# Patient Record
Sex: FEMALE | HISPANIC OR LATINO | Employment: PART TIME | URBAN - METROPOLITAN AREA
[De-identification: names, ages, dates, MRNs, and addresses within clinical notes are randomized per-mention and may not be internally consistent; named-entity substitution may affect disease eponyms.]

---

## 2023-03-02 ENCOUNTER — HOSPITAL ENCOUNTER (OUTPATIENT)
Dept: ULTRASOUND IMAGING | Facility: CLINIC | Age: 28
Discharge: HOME | End: 2023-03-02
Attending: MIDWIFE | Admitting: MIDWIFE
Payer: MEDICAID

## 2023-03-02 DIAGNOSIS — Z34.02 ENCOUNTER FOR PRENATAL CARE IN SECOND TRIMESTER OF FIRST PREGNANCY: ICD-10-CM

## 2023-03-02 DIAGNOSIS — O09.30 LATE PRENATAL CARE: ICD-10-CM

## 2023-03-02 PROCEDURE — 76805 OB US >/= 14 WKS SNGL FETUS: CPT

## 2023-03-02 PROCEDURE — 76805 OB US >/= 14 WKS SNGL FETUS: CPT | Mod: 26 | Performed by: RADIOLOGY

## 2023-03-06 LAB
HEPATITIS B SURFACE ANTIGEN (EXTERNAL): NONREACTIVE
RUBELLA ANTIBODY IGG (EXTERNAL): NORMAL

## 2023-03-27 ENCOUNTER — HOSPITAL ENCOUNTER (OUTPATIENT)
Dept: ULTRASOUND IMAGING | Facility: CLINIC | Age: 28
Discharge: HOME | End: 2023-03-27
Attending: NURSE PRACTITIONER | Admitting: NURSE PRACTITIONER
Payer: MEDICAID

## 2023-03-27 DIAGNOSIS — Z34.82 ENCOUNTER FOR SUPERVISION OF OTHER NORMAL PREGNANCY, SECOND TRIMESTER: ICD-10-CM

## 2023-03-27 PROCEDURE — 76816 OB US FOLLOW-UP PER FETUS: CPT

## 2023-03-27 PROCEDURE — 76816 OB US FOLLOW-UP PER FETUS: CPT | Mod: 26 | Performed by: RADIOLOGY

## 2023-04-17 LAB
HIV1+2 AB SERPL QL IA: NEGATIVE
VDRL (SYPHILIS) (EXTERNAL): NEGATIVE

## 2023-05-17 ENCOUNTER — MEDICAL CORRESPONDENCE (OUTPATIENT)
Dept: HEALTH INFORMATION MANAGEMENT | Facility: CLINIC | Age: 28
End: 2023-05-17
Payer: COMMERCIAL

## 2023-05-19 ENCOUNTER — TRANSCRIBE ORDERS (OUTPATIENT)
Dept: MATERNAL FETAL MEDICINE | Facility: CLINIC | Age: 28
End: 2023-05-19
Payer: COMMERCIAL

## 2023-05-19 ENCOUNTER — PRE VISIT (OUTPATIENT)
Dept: MATERNAL FETAL MEDICINE | Facility: CLINIC | Age: 28
End: 2023-05-19
Payer: COMMERCIAL

## 2023-05-19 DIAGNOSIS — O26.90 PREGNANCY RELATED CONDITION, ANTEPARTUM: Primary | ICD-10-CM

## 2023-05-22 ENCOUNTER — HOSPITAL ENCOUNTER (OUTPATIENT)
Dept: ULTRASOUND IMAGING | Facility: CLINIC | Age: 28
Discharge: HOME | End: 2023-05-22
Attending: MIDWIFE
Payer: COMMERCIAL

## 2023-05-22 ENCOUNTER — OFFICE VISIT (OUTPATIENT)
Dept: MATERNAL FETAL MEDICINE | Facility: CLINIC | Age: 28
End: 2023-05-22
Attending: MIDWIFE
Payer: COMMERCIAL

## 2023-05-22 DIAGNOSIS — O26.90 PREGNANCY RELATED CONDITION, ANTEPARTUM: ICD-10-CM

## 2023-05-22 DIAGNOSIS — O24.415 GESTATIONAL DIABETES MELLITUS (GDM) IN THIRD TRIMESTER CONTROLLED ON ORAL HYPOGLYCEMIC DRUG: Primary | ICD-10-CM

## 2023-05-22 PROCEDURE — 76811 OB US DETAILED SNGL FETUS: CPT | Mod: 26 | Performed by: OBSTETRICS & GYNECOLOGY

## 2023-05-22 PROCEDURE — 76811 OB US DETAILED SNGL FETUS: CPT

## 2023-05-22 PROCEDURE — 99203 OFFICE O/P NEW LOW 30 MIN: CPT | Mod: 25 | Performed by: OBSTETRICS & GYNECOLOGY

## 2023-05-22 NOTE — PROGRESS NOTES
Please see the imaging tab for details of the ultrasound performed today.    Nena Lawton MD  Specialist in Maternal-Fetal Medicine

## 2023-05-22 NOTE — NURSING NOTE
Patient presents to M for RL2. Positive fetal movement. Denies LOF, vaginal bleeding or cramping/contractions. SBAR given to [facility] MD, see their note in Epic.

## 2023-06-01 ENCOUNTER — OFFICE VISIT (OUTPATIENT)
Dept: MATERNAL FETAL MEDICINE | Facility: CLINIC | Age: 28
End: 2023-06-01
Attending: OBSTETRICS & GYNECOLOGY
Payer: COMMERCIAL

## 2023-06-01 ENCOUNTER — HOSPITAL ENCOUNTER (OUTPATIENT)
Dept: ULTRASOUND IMAGING | Facility: CLINIC | Age: 28
Discharge: HOME | End: 2023-06-01
Attending: OBSTETRICS & GYNECOLOGY
Payer: COMMERCIAL

## 2023-06-01 DIAGNOSIS — O24.415 GESTATIONAL DIABETES MELLITUS (GDM) IN THIRD TRIMESTER CONTROLLED ON ORAL HYPOGLYCEMIC DRUG: ICD-10-CM

## 2023-06-01 DIAGNOSIS — O24.415 GESTATIONAL DIABETES MELLITUS (GDM) IN THIRD TRIMESTER CONTROLLED ON ORAL HYPOGLYCEMIC DRUG: Primary | ICD-10-CM

## 2023-06-01 PROCEDURE — 76819 FETAL BIOPHYS PROFIL W/O NST: CPT

## 2023-06-01 PROCEDURE — 76819 FETAL BIOPHYS PROFIL W/O NST: CPT | Mod: 26 | Performed by: OBSTETRICS & GYNECOLOGY

## 2023-06-01 NOTE — NURSING NOTE
Patient presents to MFM for BPP. Positive fetal movement. Denies LOF, vaginal bleeding or cramping/contractions. SBAR given to MFM MD, see their note in Epic.

## 2023-06-01 NOTE — PROGRESS NOTES
The patient was seen for an ultrasound in the Maternal-Fetal Medicine Center at the [facility] today.  For a detailed report of the ultrasound examination, please see the ultrasound report which can be found under the imaging tab.    If you have questions regarding today's evaluation or if we can be of further service, please contact the Maternal-Fetal Medicine Center.    Jennifer Rios MD  , OB/GYN  Maternal-Fetal Medicine  [phone] (Pager)

## 2023-06-05 ENCOUNTER — OFFICE VISIT (OUTPATIENT)
Dept: MATERNAL FETAL MEDICINE | Facility: CLINIC | Age: 28
End: 2023-06-05
Attending: OBSTETRICS & GYNECOLOGY
Payer: COMMERCIAL

## 2023-06-05 ENCOUNTER — HOSPITAL ENCOUNTER (OUTPATIENT)
Dept: ULTRASOUND IMAGING | Facility: CLINIC | Age: 28
Discharge: HOME | End: 2023-06-05
Attending: OBSTETRICS & GYNECOLOGY
Payer: COMMERCIAL

## 2023-06-05 DIAGNOSIS — O24.415 GESTATIONAL DIABETES MELLITUS (GDM) IN THIRD TRIMESTER CONTROLLED ON ORAL HYPOGLYCEMIC DRUG: Primary | ICD-10-CM

## 2023-06-05 DIAGNOSIS — O24.415 GESTATIONAL DIABETES MELLITUS (GDM) IN THIRD TRIMESTER CONTROLLED ON ORAL HYPOGLYCEMIC DRUG: ICD-10-CM

## 2023-06-05 PROCEDURE — 76819 FETAL BIOPHYS PROFIL W/O NST: CPT | Mod: 26 | Performed by: OBSTETRICS & GYNECOLOGY

## 2023-06-05 PROCEDURE — 76819 FETAL BIOPHYS PROFIL W/O NST: CPT

## 2023-06-05 NOTE — PROGRESS NOTES
"Please see \"imaging\" tab under \"Chart Review\" for details of today's US at the [facility].    Philipp Rice MD  Maternal-Fetal Medicine    "

## 2023-06-05 NOTE — NURSING NOTE
IPAD  used for [facility] ultrasound and office visit.  Patient reports positive fetal movement, no pain, no contractions, leaking of fluid, or bleeding.  Reports blood sugar values fasting today 85, within normal range on metformin.  Patient denies headache, visual changes, nausea/vomiting, epigastric pain related to preeclampsia.  Education provided to patient on biophysical profile.  SBAR given to MFM MD, see their note in Epic.

## 2023-06-08 ENCOUNTER — HOSPITAL ENCOUNTER (OUTPATIENT)
Dept: ULTRASOUND IMAGING | Facility: CLINIC | Age: 28
Discharge: HOME | End: 2023-06-08
Attending: OBSTETRICS & GYNECOLOGY
Payer: COMMERCIAL

## 2023-06-08 ENCOUNTER — OFFICE VISIT (OUTPATIENT)
Dept: MATERNAL FETAL MEDICINE | Facility: CLINIC | Age: 28
End: 2023-06-08
Attending: OBSTETRICS & GYNECOLOGY
Payer: COMMERCIAL

## 2023-06-08 DIAGNOSIS — O24.415 GESTATIONAL DIABETES MELLITUS (GDM) IN THIRD TRIMESTER CONTROLLED ON ORAL HYPOGLYCEMIC DRUG: Primary | ICD-10-CM

## 2023-06-08 DIAGNOSIS — O24.415 GESTATIONAL DIABETES MELLITUS (GDM) IN THIRD TRIMESTER CONTROLLED ON ORAL HYPOGLYCEMIC DRUG: ICD-10-CM

## 2023-06-08 PROCEDURE — 76819 FETAL BIOPHYS PROFIL W/O NST: CPT

## 2023-06-08 PROCEDURE — 76819 FETAL BIOPHYS PROFIL W/O NST: CPT | Mod: 26 | Performed by: OBSTETRICS & GYNECOLOGY

## 2023-06-08 NOTE — PROGRESS NOTES
"Please see \"imaging\" tab under \"Chart Review\" for details of today's US at the [facility].    Philipp Rice MD  Maternal-Fetal Medicine    "

## 2023-06-08 NOTE — NURSING NOTE
Patient presents to [facility] for BPP at 33+5 weeks due to GDM on Metformin. Positive fetal movement. Denies LOF, vaginal bleeding or cramping/contractions. Patient states BS are largely within normal limits. SBAR given to MFM MD, see their note in Epic. iPad  used for [facility] appoinitment today.  Patient will continue 2x/week surveillance with [facility].

## 2023-06-12 ENCOUNTER — OFFICE VISIT (OUTPATIENT)
Dept: MATERNAL FETAL MEDICINE | Facility: CLINIC | Age: 28
End: 2023-06-12
Attending: OBSTETRICS & GYNECOLOGY
Payer: COMMERCIAL

## 2023-06-12 ENCOUNTER — HOSPITAL ENCOUNTER (OUTPATIENT)
Dept: ULTRASOUND IMAGING | Facility: CLINIC | Age: 28
Discharge: HOME | End: 2023-06-12
Attending: OBSTETRICS & GYNECOLOGY
Payer: COMMERCIAL

## 2023-06-12 DIAGNOSIS — O24.415 GESTATIONAL DIABETES MELLITUS (GDM) IN THIRD TRIMESTER CONTROLLED ON ORAL HYPOGLYCEMIC DRUG: Primary | ICD-10-CM

## 2023-06-12 DIAGNOSIS — O24.415 GESTATIONAL DIABETES MELLITUS (GDM) IN THIRD TRIMESTER CONTROLLED ON ORAL HYPOGLYCEMIC DRUG: ICD-10-CM

## 2023-06-12 PROCEDURE — 76816 OB US FOLLOW-UP PER FETUS: CPT | Mod: 26 | Performed by: OBSTETRICS & GYNECOLOGY

## 2023-06-12 PROCEDURE — 76819 FETAL BIOPHYS PROFIL W/O NST: CPT | Mod: 26 | Performed by: OBSTETRICS & GYNECOLOGY

## 2023-06-12 PROCEDURE — 76816 OB US FOLLOW-UP PER FETUS: CPT

## 2023-06-12 NOTE — PROGRESS NOTES
The patient was seen for an ultrasound in the Maternal-Fetal Medicine Center at the [facility] today.  For a detailed report of the ultrasound examination, please see the ultrasound report which can be found under the imaging tab.    If you have questions regarding today's evaluation or if we can be of further service, please contact the Maternal-Fetal Medicine Center.    Jennifer Rios MD  , OB/GYN  Maternal-Fetal Medicine  [phone] (Pager)

## 2023-06-15 ENCOUNTER — OFFICE VISIT (OUTPATIENT)
Dept: MATERNAL FETAL MEDICINE | Facility: CLINIC | Age: 28
End: 2023-06-15
Attending: OBSTETRICS & GYNECOLOGY
Payer: COMMERCIAL

## 2023-06-15 ENCOUNTER — HOSPITAL ENCOUNTER (OUTPATIENT)
Dept: ULTRASOUND IMAGING | Facility: CLINIC | Age: 28
Discharge: HOME | End: 2023-06-15
Attending: OBSTETRICS & GYNECOLOGY
Payer: COMMERCIAL

## 2023-06-15 DIAGNOSIS — O24.415 GESTATIONAL DIABETES MELLITUS (GDM) IN THIRD TRIMESTER CONTROLLED ON ORAL HYPOGLYCEMIC DRUG: ICD-10-CM

## 2023-06-15 DIAGNOSIS — O24.415 GESTATIONAL DIABETES MELLITUS (GDM) IN THIRD TRIMESTER CONTROLLED ON ORAL HYPOGLYCEMIC DRUG: Primary | ICD-10-CM

## 2023-06-15 PROCEDURE — 76819 FETAL BIOPHYS PROFIL W/O NST: CPT

## 2023-06-15 PROCEDURE — 76819 FETAL BIOPHYS PROFIL W/O NST: CPT | Mod: 26 | Performed by: OBSTETRICS & GYNECOLOGY

## 2023-06-15 NOTE — NURSING NOTE
Patient presents to [facility] for BPP.  ipad used for duration of appointment. Positive fetal movement. Denies LOF, vaginal bleeding or cramping/contractions. States BS are well controlled. SBAR given to [facility] BORJAS, see their note in Epic.

## 2023-06-15 NOTE — PROGRESS NOTES
"Please see \"imaging\" tab under \"Chart Review\" for details of today's US at the [facility].    Philipp Rice MD  Maternal-Fetal Medicine    "

## 2023-06-19 ENCOUNTER — HOSPITAL ENCOUNTER (OUTPATIENT)
Dept: ULTRASOUND IMAGING | Facility: CLINIC | Age: 28
Discharge: HOME | End: 2023-06-19
Attending: OBSTETRICS & GYNECOLOGY
Payer: COMMERCIAL

## 2023-06-19 ENCOUNTER — OFFICE VISIT (OUTPATIENT)
Dept: MATERNAL FETAL MEDICINE | Facility: CLINIC | Age: 28
End: 2023-06-19
Attending: OBSTETRICS & GYNECOLOGY
Payer: COMMERCIAL

## 2023-06-19 DIAGNOSIS — O24.415 GESTATIONAL DIABETES MELLITUS (GDM) IN THIRD TRIMESTER CONTROLLED ON ORAL HYPOGLYCEMIC DRUG: ICD-10-CM

## 2023-06-19 DIAGNOSIS — O24.415 GESTATIONAL DIABETES MELLITUS (GDM) IN THIRD TRIMESTER CONTROLLED ON ORAL HYPOGLYCEMIC DRUG: Primary | ICD-10-CM

## 2023-06-19 PROCEDURE — 76819 FETAL BIOPHYS PROFIL W/O NST: CPT

## 2023-06-19 PROCEDURE — 76819 FETAL BIOPHYS PROFIL W/O NST: CPT | Mod: 26 | Performed by: OBSTETRICS & GYNECOLOGY

## 2023-06-19 NOTE — NURSING NOTE
IPAD  used for [facility] ultrasound and office visit. Patient reports positive fetal movement, no pain, no contractions, leaking of fluid, or bleeding.  Reports blood sugar values within range with metformin.  Patient denies headache, visual changes, nausea/vomiting, epigastric pain related to preeclampsia. SBAR given to MFM MD, see their note in Epic.

## 2023-06-19 NOTE — PROGRESS NOTES
Please refer to ultrasound report under 'Imaging' Studies of 'Chart Review' tabs.    Mik Boyle M.D.

## 2023-06-22 ENCOUNTER — HOSPITAL ENCOUNTER (OUTPATIENT)
Dept: ULTRASOUND IMAGING | Facility: CLINIC | Age: 28
Discharge: HOME | End: 2023-06-22
Attending: OBSTETRICS & GYNECOLOGY
Payer: COMMERCIAL

## 2023-06-22 ENCOUNTER — OFFICE VISIT (OUTPATIENT)
Dept: MATERNAL FETAL MEDICINE | Facility: CLINIC | Age: 28
End: 2023-06-22
Attending: OBSTETRICS & GYNECOLOGY
Payer: COMMERCIAL

## 2023-06-22 DIAGNOSIS — O24.415 GESTATIONAL DIABETES MELLITUS (GDM) IN THIRD TRIMESTER CONTROLLED ON ORAL HYPOGLYCEMIC DRUG: ICD-10-CM

## 2023-06-22 DIAGNOSIS — O24.415 GESTATIONAL DIABETES MELLITUS (GDM) IN THIRD TRIMESTER CONTROLLED ON ORAL HYPOGLYCEMIC DRUG: Primary | ICD-10-CM

## 2023-06-22 PROCEDURE — 76819 FETAL BIOPHYS PROFIL W/O NST: CPT

## 2023-06-22 PROCEDURE — 76819 FETAL BIOPHYS PROFIL W/O NST: CPT | Mod: 26 | Performed by: OBSTETRICS & GYNECOLOGY

## 2023-06-22 NOTE — PROGRESS NOTES
The patient was seen for an ultrasound in the Maternal-Fetal Medicine Center at the [facility] today.  For a detailed report of the ultrasound examination, please see the ultrasound report which can be found under the imaging tab.    If you have questions regarding today's evaluation or if we can be of further service, please contact the Maternal-Fetal Medicine Center.    Jennifer Rios MD  , OB/GYN  Maternal-Fetal Medicine  [phone] (Pager)

## 2023-06-22 NOTE — NURSING NOTE
Ipad  used for appointment. Patient presents to MFM for BPP. Positive fetal movement. Denies LOF, vaginal bleeding or cramping/contractions. SBAR given to MFM MD, see their note in Epic.    Rosalia Preciado RN

## 2023-06-26 ENCOUNTER — HOSPITAL ENCOUNTER (OUTPATIENT)
Dept: ULTRASOUND IMAGING | Facility: CLINIC | Age: 28
Discharge: HOME | End: 2023-06-26
Attending: OBSTETRICS & GYNECOLOGY
Payer: COMMERCIAL

## 2023-06-26 ENCOUNTER — OFFICE VISIT (OUTPATIENT)
Dept: MATERNAL FETAL MEDICINE | Facility: CLINIC | Age: 28
End: 2023-06-26
Attending: OBSTETRICS & GYNECOLOGY
Payer: COMMERCIAL

## 2023-06-26 DIAGNOSIS — O24.415 GESTATIONAL DIABETES MELLITUS (GDM) IN THIRD TRIMESTER CONTROLLED ON ORAL HYPOGLYCEMIC DRUG: ICD-10-CM

## 2023-06-26 DIAGNOSIS — O24.415 GESTATIONAL DIABETES MELLITUS (GDM) IN THIRD TRIMESTER CONTROLLED ON ORAL HYPOGLYCEMIC DRUG: Primary | ICD-10-CM

## 2023-06-26 PROCEDURE — 76819 FETAL BIOPHYS PROFIL W/O NST: CPT | Mod: 26 | Performed by: OBSTETRICS & GYNECOLOGY

## 2023-06-26 PROCEDURE — 76819 FETAL BIOPHYS PROFIL W/O NST: CPT

## 2023-06-26 NOTE — NURSING NOTE
Patient presents to [facility] for BPP due to GDM on metformin. States BS are WNL. Positive fetal movement. Denies LOF, vaginal bleeding or cramping/contractions. SBAR given to [facility] MD, see their note in Epic.    Rosalia Preciado RN

## 2023-06-26 NOTE — PROGRESS NOTES
Please see full imaging report from [employer] program under imaging tab.    BPP 8/8    Howie Fernandes MD  Maternal Fetal Medicine

## 2023-06-29 ENCOUNTER — HOSPITAL ENCOUNTER (OUTPATIENT)
Dept: ULTRASOUND IMAGING | Facility: CLINIC | Age: 28
Discharge: HOME | End: 2023-06-29
Attending: OBSTETRICS & GYNECOLOGY
Payer: COMMERCIAL

## 2023-06-29 ENCOUNTER — OFFICE VISIT (OUTPATIENT)
Dept: MATERNAL FETAL MEDICINE | Facility: CLINIC | Age: 28
End: 2023-06-29
Attending: OBSTETRICS & GYNECOLOGY
Payer: COMMERCIAL

## 2023-06-29 DIAGNOSIS — O24.415 GESTATIONAL DIABETES MELLITUS (GDM) IN THIRD TRIMESTER CONTROLLED ON ORAL HYPOGLYCEMIC DRUG: ICD-10-CM

## 2023-06-29 DIAGNOSIS — O24.415 GESTATIONAL DIABETES MELLITUS (GDM) IN THIRD TRIMESTER CONTROLLED ON ORAL HYPOGLYCEMIC DRUG: Primary | ICD-10-CM

## 2023-06-29 PROCEDURE — 76819 FETAL BIOPHYS PROFIL W/O NST: CPT | Mod: 26 | Performed by: OBSTETRICS & GYNECOLOGY

## 2023-06-29 PROCEDURE — 76819 FETAL BIOPHYS PROFIL W/O NST: CPT

## 2023-06-29 NOTE — NURSING NOTE
[place] IPAD  used for MFM office visit and ultrasound.  Patient reports positive fetal movement, no pain, no contractions, leaking of fluid, or bleeding.  Reports blood sugar values fasting 86 this morning and post prandial within range.  Patient denies headache, visual changes, nausea/vomiting, epigastric pain related to preeclampsia.  SBAR given to MFM MD, see their note in Epic.

## 2023-06-29 NOTE — PROGRESS NOTES
"Please see \"Imaging\" tab under \"Chart Review\" for details of today's visit.    Eden Sepulveda    "

## 2023-07-03 ENCOUNTER — OFFICE VISIT (OUTPATIENT)
Dept: MATERNAL FETAL MEDICINE | Facility: CLINIC | Age: 28
End: 2023-07-03
Attending: OBSTETRICS & GYNECOLOGY
Payer: COMMERCIAL

## 2023-07-03 ENCOUNTER — HOSPITAL ENCOUNTER (OUTPATIENT)
Dept: ULTRASOUND IMAGING | Facility: CLINIC | Age: 28
Discharge: HOME | End: 2023-07-03
Attending: OBSTETRICS & GYNECOLOGY
Payer: COMMERCIAL

## 2023-07-03 DIAGNOSIS — O24.415 GESTATIONAL DIABETES MELLITUS (GDM) IN THIRD TRIMESTER CONTROLLED ON ORAL HYPOGLYCEMIC DRUG: ICD-10-CM

## 2023-07-03 DIAGNOSIS — O24.415 GESTATIONAL DIABETES MELLITUS (GDM) IN THIRD TRIMESTER CONTROLLED ON ORAL HYPOGLYCEMIC DRUG: Primary | ICD-10-CM

## 2023-07-03 LAB — GROUP B STREPTOCOCCUS (EXTERNAL): NEGATIVE

## 2023-07-03 PROCEDURE — 76816 OB US FOLLOW-UP PER FETUS: CPT | Mod: 26 | Performed by: OBSTETRICS & GYNECOLOGY

## 2023-07-03 PROCEDURE — 76819 FETAL BIOPHYS PROFIL W/O NST: CPT | Mod: 26 | Performed by: OBSTETRICS & GYNECOLOGY

## 2023-07-03 PROCEDURE — 76819 FETAL BIOPHYS PROFIL W/O NST: CPT

## 2023-07-03 NOTE — PROGRESS NOTES
Please see full imaging report from [employer] program under imaging tab.    Howie Fernandes MD  Maternal Fetal Medicine

## 2023-07-03 NOTE — NURSING NOTE
Patient presents to [facility] for RL2/BPP at 37+2 weeks due to GDM on Metformin. Patient states BS are largely within normal range. Positive fetal movement. Denies LOF, vaginal bleeding or cramping/contractions. SBAR given to YESSICA MD, see their note in Epic.

## 2023-07-07 ENCOUNTER — HOSPITAL ENCOUNTER (OUTPATIENT)
Dept: ULTRASOUND IMAGING | Facility: CLINIC | Age: 28
Discharge: HOME | End: 2023-07-07
Attending: OBSTETRICS & GYNECOLOGY
Payer: COMMERCIAL

## 2023-07-07 ENCOUNTER — OFFICE VISIT (OUTPATIENT)
Dept: MATERNAL FETAL MEDICINE | Facility: CLINIC | Age: 28
End: 2023-07-07
Attending: OBSTETRICS & GYNECOLOGY
Payer: COMMERCIAL

## 2023-07-07 DIAGNOSIS — O24.415 GESTATIONAL DIABETES MELLITUS (GDM) IN THIRD TRIMESTER CONTROLLED ON ORAL HYPOGLYCEMIC DRUG: Primary | ICD-10-CM

## 2023-07-07 DIAGNOSIS — O24.415 GESTATIONAL DIABETES MELLITUS (GDM) IN THIRD TRIMESTER CONTROLLED ON ORAL HYPOGLYCEMIC DRUG: ICD-10-CM

## 2023-07-07 PROCEDURE — 76819 FETAL BIOPHYS PROFIL W/O NST: CPT

## 2023-07-07 PROCEDURE — 76819 FETAL BIOPHYS PROFIL W/O NST: CPT | Mod: 26 | Performed by: OBSTETRICS & GYNECOLOGY

## 2023-07-07 NOTE — NURSING NOTE
Patient presents to M for BPP. Positive fetal movement. States BS are well controlled with Metformin. Denies LOF, vaginal bleeding or cramping/contractions. SBAR given to [facility] MD, see their note in Epic.     Ipad used for appointment.    Rosalia Preciado RN

## 2023-07-08 NOTE — PROGRESS NOTES
"Please see \"Imaging\" tab under \"Chart Review\" for details of today's ultrasound.    Meño Stahl M.D.  Specialist in Maternal-Fetal Medicine     "

## 2023-07-11 ENCOUNTER — OFFICE VISIT (OUTPATIENT)
Dept: MATERNAL FETAL MEDICINE | Facility: CLINIC | Age: 28
End: 2023-07-11
Attending: OBSTETRICS & GYNECOLOGY
Payer: COMMERCIAL

## 2023-07-11 ENCOUNTER — HOSPITAL ENCOUNTER (OUTPATIENT)
Dept: ULTRASOUND IMAGING | Facility: CLINIC | Age: 28
Discharge: HOME | End: 2023-07-11
Attending: OBSTETRICS & GYNECOLOGY
Payer: COMMERCIAL

## 2023-07-11 DIAGNOSIS — O24.415 GESTATIONAL DIABETES MELLITUS (GDM) IN THIRD TRIMESTER CONTROLLED ON ORAL HYPOGLYCEMIC DRUG: Primary | ICD-10-CM

## 2023-07-11 DIAGNOSIS — O24.415 GESTATIONAL DIABETES MELLITUS (GDM) IN THIRD TRIMESTER CONTROLLED ON ORAL HYPOGLYCEMIC DRUG: ICD-10-CM

## 2023-07-11 PROCEDURE — 76819 FETAL BIOPHYS PROFIL W/O NST: CPT | Mod: 26 | Performed by: OBSTETRICS & GYNECOLOGY

## 2023-07-11 PROCEDURE — 76819 FETAL BIOPHYS PROFIL W/O NST: CPT

## 2023-07-11 NOTE — NURSING NOTE
IPAD  used for [facility] ultrasound and office visit.  Patient reports positive fetal movement, no pain, no contractions, leaking of fluid, or bleeding.  Reports blood sugar values within range.  Pt hoping to wait for spontaneous labor.  SBAR given to [facility]ST[facility] BORJAS, see their note in Epic.

## 2023-07-14 ENCOUNTER — HOSPITAL ENCOUNTER (OUTPATIENT)
Dept: ULTRASOUND IMAGING | Facility: CLINIC | Age: 28
Discharge: HOME | End: 2023-07-14
Attending: OBSTETRICS & GYNECOLOGY
Payer: COMMERCIAL

## 2023-07-14 ENCOUNTER — HOSPITAL ENCOUNTER (INPATIENT)
Facility: CLINIC | Age: 28
LOS: 2 days | Discharge: HOME HEALTH | End: 2023-07-16
Attending: STUDENT IN AN ORGANIZED HEALTH CARE EDUCATION/TRAINING PROGRAM | Admitting: STUDENT IN AN ORGANIZED HEALTH CARE EDUCATION/TRAINING PROGRAM
Payer: COMMERCIAL

## 2023-07-14 ENCOUNTER — OFFICE VISIT (OUTPATIENT)
Dept: MATERNAL FETAL MEDICINE | Facility: CLINIC | Age: 28
End: 2023-07-14
Attending: OBSTETRICS & GYNECOLOGY
Payer: COMMERCIAL

## 2023-07-14 ENCOUNTER — ANESTHESIA (OUTPATIENT)
Dept: OBGYN | Facility: CLINIC | Age: 28
End: 2023-07-14
Payer: COMMERCIAL

## 2023-07-14 ENCOUNTER — ANESTHESIA EVENT (OUTPATIENT)
Dept: OBGYN | Facility: CLINIC | Age: 28
End: 2023-07-14
Payer: COMMERCIAL

## 2023-07-14 ENCOUNTER — HOSPITAL ENCOUNTER (OUTPATIENT)
Facility: CLINIC | Age: 28
End: 2023-07-14
Attending: FAMILY MEDICINE | Admitting: FAMILY MEDICINE
Payer: COMMERCIAL

## 2023-07-14 DIAGNOSIS — O24.415 GESTATIONAL DIABETES MELLITUS (GDM) IN THIRD TRIMESTER CONTROLLED ON ORAL HYPOGLYCEMIC DRUG: Primary | ICD-10-CM

## 2023-07-14 DIAGNOSIS — O24.415 GESTATIONAL DIABETES MELLITUS (GDM) IN THIRD TRIMESTER CONTROLLED ON ORAL HYPOGLYCEMIC DRUG: ICD-10-CM

## 2023-07-14 PROBLEM — E11.9 DIABETES (H): Status: ACTIVE | Noted: 2023-07-14

## 2023-07-14 LAB
ABO/RH(D): NORMAL
ANTIBODY SCREEN: NEGATIVE
GLUCOSE BLDC GLUCOMTR-MCNC: 110 MG/DL (ref 70–99)
GLUCOSE BLDC GLUCOMTR-MCNC: 112 MG/DL (ref 70–99)
GLUCOSE BLDC GLUCOMTR-MCNC: 115 MG/DL (ref 70–99)
GLUCOSE BLDC GLUCOMTR-MCNC: 117 MG/DL (ref 70–99)
GLUCOSE BLDC GLUCOMTR-MCNC: 130 MG/DL (ref 70–99)
GLUCOSE BLDC GLUCOMTR-MCNC: 134 MG/DL (ref 70–99)
HGB BLD-MCNC: 14 G/DL (ref 11.7–15.7)
SPECIMEN EXPIRATION DATE: NORMAL

## 2023-07-14 PROCEDURE — 76819 FETAL BIOPHYS PROFIL W/O NST: CPT

## 2023-07-14 PROCEDURE — 76819 FETAL BIOPHYS PROFIL W/O NST: CPT | Mod: 26 | Performed by: OBSTETRICS & GYNECOLOGY

## 2023-07-14 PROCEDURE — 36415 COLL VENOUS BLD VENIPUNCTURE: CPT | Performed by: STUDENT IN AN ORGANIZED HEALTH CARE EDUCATION/TRAINING PROGRAM

## 2023-07-14 PROCEDURE — 99213 OFFICE O/P EST LOW 20 MIN: CPT | Mod: 25 | Performed by: OBSTETRICS & GYNECOLOGY

## 2023-07-14 PROCEDURE — 370N000003 HC ANESTHESIA WARD SERVICE: Performed by: SURGERY

## 2023-07-14 PROCEDURE — 250N000011 HC RX IP 250 OP 636: Performed by: SURGERY

## 2023-07-14 PROCEDURE — 120N000001 HC R&B MED SURG/OB

## 2023-07-14 PROCEDURE — 86780 TREPONEMA PALLIDUM: CPT | Performed by: STUDENT IN AN ORGANIZED HEALTH CARE EDUCATION/TRAINING PROGRAM

## 2023-07-14 PROCEDURE — 85018 HEMOGLOBIN: CPT | Performed by: STUDENT IN AN ORGANIZED HEALTH CARE EDUCATION/TRAINING PROGRAM

## 2023-07-14 PROCEDURE — 10907ZC DRAINAGE OF AMNIOTIC FLUID, THERAPEUTIC FROM PRODUCTS OF CONCEPTION, VIA NATURAL OR ARTIFICIAL OPENING: ICD-10-PCS | Performed by: STUDENT IN AN ORGANIZED HEALTH CARE EDUCATION/TRAINING PROGRAM

## 2023-07-14 PROCEDURE — 250N000009 HC RX 250: Performed by: OBSTETRICS & GYNECOLOGY

## 2023-07-14 PROCEDURE — 250N000012 HC RX MED GY IP 250 OP 636 PS 637: Performed by: STUDENT IN AN ORGANIZED HEALTH CARE EDUCATION/TRAINING PROGRAM

## 2023-07-14 PROCEDURE — 86850 RBC ANTIBODY SCREEN: CPT | Performed by: STUDENT IN AN ORGANIZED HEALTH CARE EDUCATION/TRAINING PROGRAM

## 2023-07-14 PROCEDURE — 258N000003 HC RX IP 258 OP 636: Performed by: STUDENT IN AN ORGANIZED HEALTH CARE EDUCATION/TRAINING PROGRAM

## 2023-07-14 PROCEDURE — 250N000011 HC RX IP 250 OP 636: Mod: JZ | Performed by: SURGERY

## 2023-07-14 PROCEDURE — 250N000011 HC RX IP 250 OP 636: Performed by: STUDENT IN AN ORGANIZED HEALTH CARE EDUCATION/TRAINING PROGRAM

## 2023-07-14 RX ORDER — VITAMIN A ACETATE, .BETA.-CAROTENE, ASCORBIC ACID, CHOLECALCIFEROL, .ALPHA.-TOCOPHEROL ACETATE, DL-, THIAMINE MONONITRATE, RIBOFLAVIN, NIACINAMIDE, PYRIDOXINE HYDROCHLORIDE, FOLIC ACID, CYANOCOBALAMIN, CALCIUM CARBONATE, FERROUS FUMARATE, ZINC OXIDE, AND CUPRIC OXIDE 2000; 2000; 120; 400; 22; 1.84; 3; 20; 10; 1; 12; 200; 27; 25; 2 [IU]/1; [IU]/1; MG/1; [IU]/1; MG/1; MG/1; MG/1; MG/1; MG/1; MG/1; UG/1; MG/1; MG/1; MG/1; MG/1
1 TABLET ORAL DAILY
COMMUNITY

## 2023-07-14 RX ORDER — SODIUM CHLORIDE, SODIUM LACTATE, POTASSIUM CHLORIDE, CALCIUM CHLORIDE 600; 310; 30; 20 MG/100ML; MG/100ML; MG/100ML; MG/100ML
INJECTION, SOLUTION INTRAVENOUS CONTINUOUS PRN
Status: DISCONTINUED | OUTPATIENT
Start: 2023-07-14 | End: 2023-07-15 | Stop reason: HOSPADM

## 2023-07-14 RX ORDER — ONDANSETRON 4 MG/1
4 TABLET, ORALLY DISINTEGRATING ORAL EVERY 6 HOURS PRN
Status: DISCONTINUED | OUTPATIENT
Start: 2023-07-14 | End: 2023-07-15 | Stop reason: HOSPADM

## 2023-07-14 RX ORDER — METOCLOPRAMIDE 10 MG/1
10 TABLET ORAL EVERY 6 HOURS PRN
Status: DISCONTINUED | OUTPATIENT
Start: 2023-07-14 | End: 2023-07-15 | Stop reason: HOSPADM

## 2023-07-14 RX ORDER — NICOTINE POLACRILEX 4 MG
15-30 LOZENGE BUCCAL
Status: DISCONTINUED | OUTPATIENT
Start: 2023-07-14 | End: 2023-07-15 | Stop reason: HOSPADM

## 2023-07-14 RX ORDER — NALOXONE HYDROCHLORIDE 0.4 MG/ML
0.4 INJECTION, SOLUTION INTRAMUSCULAR; INTRAVENOUS; SUBCUTANEOUS
Status: DISCONTINUED | OUTPATIENT
Start: 2023-07-14 | End: 2023-07-15 | Stop reason: HOSPADM

## 2023-07-14 RX ORDER — NALOXONE HYDROCHLORIDE 0.4 MG/ML
0.2 INJECTION, SOLUTION INTRAMUSCULAR; INTRAVENOUS; SUBCUTANEOUS
Status: DISCONTINUED | OUTPATIENT
Start: 2023-07-14 | End: 2023-07-15 | Stop reason: HOSPADM

## 2023-07-14 RX ORDER — ROPIVACAINE HYDROCHLORIDE 2 MG/ML
INJECTION, SOLUTION EPIDURAL; INFILTRATION; PERINEURAL
Status: COMPLETED | OUTPATIENT
Start: 2023-07-14 | End: 2023-07-14

## 2023-07-14 RX ORDER — CARBOPROST TROMETHAMINE 250 UG/ML
250 INJECTION, SOLUTION INTRAMUSCULAR
Status: DISCONTINUED | OUTPATIENT
Start: 2023-07-14 | End: 2023-07-15 | Stop reason: HOSPADM

## 2023-07-14 RX ORDER — KETOROLAC TROMETHAMINE 30 MG/ML
30 INJECTION, SOLUTION INTRAMUSCULAR; INTRAVENOUS
Status: DISCONTINUED | OUTPATIENT
Start: 2023-07-14 | End: 2023-07-15

## 2023-07-14 RX ORDER — ROPIVACAINE HYDROCHLORIDE 2 MG/ML
10 INJECTION, SOLUTION EPIDURAL; INFILTRATION; PERINEURAL ONCE
Status: DISCONTINUED | OUTPATIENT
Start: 2023-07-14 | End: 2023-07-15 | Stop reason: HOSPADM

## 2023-07-14 RX ORDER — TRANEXAMIC ACID 10 MG/ML
1 INJECTION, SOLUTION INTRAVENOUS EVERY 30 MIN PRN
Status: DISCONTINUED | OUTPATIENT
Start: 2023-07-14 | End: 2023-07-15 | Stop reason: HOSPADM

## 2023-07-14 RX ORDER — PROCHLORPERAZINE 25 MG
25 SUPPOSITORY, RECTAL RECTAL EVERY 12 HOURS PRN
Status: DISCONTINUED | OUTPATIENT
Start: 2023-07-14 | End: 2023-07-15 | Stop reason: HOSPADM

## 2023-07-14 RX ORDER — FENTANYL CITRATE 50 UG/ML
50 INJECTION, SOLUTION INTRAMUSCULAR; INTRAVENOUS EVERY 30 MIN PRN
Status: DISCONTINUED | OUTPATIENT
Start: 2023-07-14 | End: 2023-07-15 | Stop reason: HOSPADM

## 2023-07-14 RX ORDER — EPHEDRINE SULFATE 50 MG/ML
5 INJECTION, SOLUTION INTRAMUSCULAR; INTRAVENOUS; SUBCUTANEOUS
Status: DISCONTINUED | OUTPATIENT
Start: 2023-07-14 | End: 2023-07-15 | Stop reason: HOSPADM

## 2023-07-14 RX ORDER — ONDANSETRON 2 MG/ML
4 INJECTION INTRAMUSCULAR; INTRAVENOUS EVERY 6 HOURS PRN
Status: DISCONTINUED | OUTPATIENT
Start: 2023-07-14 | End: 2023-07-15 | Stop reason: HOSPADM

## 2023-07-14 RX ORDER — PROCHLORPERAZINE MALEATE 5 MG
10 TABLET ORAL EVERY 6 HOURS PRN
Status: DISCONTINUED | OUTPATIENT
Start: 2023-07-14 | End: 2023-07-15 | Stop reason: HOSPADM

## 2023-07-14 RX ORDER — ACETAMINOPHEN 325 MG/1
650 TABLET ORAL EVERY 4 HOURS PRN
Status: DISCONTINUED | OUTPATIENT
Start: 2023-07-14 | End: 2023-07-15 | Stop reason: HOSPADM

## 2023-07-14 RX ORDER — METOCLOPRAMIDE HYDROCHLORIDE 5 MG/ML
10 INJECTION INTRAMUSCULAR; INTRAVENOUS EVERY 6 HOURS PRN
Status: DISCONTINUED | OUTPATIENT
Start: 2023-07-14 | End: 2023-07-15 | Stop reason: HOSPADM

## 2023-07-14 RX ORDER — METHYLERGONOVINE MALEATE 0.2 MG/ML
200 INJECTION INTRAVENOUS
Status: DISCONTINUED | OUTPATIENT
Start: 2023-07-14 | End: 2023-07-15 | Stop reason: HOSPADM

## 2023-07-14 RX ORDER — IBUPROFEN 400 MG/1
800 TABLET, FILM COATED ORAL
Status: DISCONTINUED | OUTPATIENT
Start: 2023-07-14 | End: 2023-07-15

## 2023-07-14 RX ORDER — OXYTOCIN 10 [USP'U]/ML
10 INJECTION, SOLUTION INTRAMUSCULAR; INTRAVENOUS
Status: DISCONTINUED | OUTPATIENT
Start: 2023-07-14 | End: 2023-07-15

## 2023-07-14 RX ORDER — OXYTOCIN/0.9 % SODIUM CHLORIDE 30/500 ML
100-340 PLASTIC BAG, INJECTION (ML) INTRAVENOUS CONTINUOUS PRN
Status: DISCONTINUED | OUTPATIENT
Start: 2023-07-14 | End: 2023-07-15

## 2023-07-14 RX ORDER — MISOPROSTOL 200 UG/1
800 TABLET ORAL
Status: DISCONTINUED | OUTPATIENT
Start: 2023-07-14 | End: 2023-07-15 | Stop reason: HOSPADM

## 2023-07-14 RX ORDER — DEXTROSE MONOHYDRATE 25 G/50ML
25-50 INJECTION, SOLUTION INTRAVENOUS
Status: DISCONTINUED | OUTPATIENT
Start: 2023-07-14 | End: 2023-07-15 | Stop reason: HOSPADM

## 2023-07-14 RX ORDER — OXYTOCIN/0.9 % SODIUM CHLORIDE 30/500 ML
340 PLASTIC BAG, INJECTION (ML) INTRAVENOUS CONTINUOUS PRN
Status: DISCONTINUED | OUTPATIENT
Start: 2023-07-14 | End: 2023-07-15 | Stop reason: HOSPADM

## 2023-07-14 RX ORDER — MISOPROSTOL 200 UG/1
400 TABLET ORAL
Status: DISCONTINUED | OUTPATIENT
Start: 2023-07-14 | End: 2023-07-15 | Stop reason: HOSPADM

## 2023-07-14 RX ORDER — SODIUM CHLORIDE 9 MG/ML
INJECTION, SOLUTION INTRAVENOUS CONTINUOUS
Status: DISCONTINUED | OUTPATIENT
Start: 2023-07-14 | End: 2023-07-15 | Stop reason: HOSPADM

## 2023-07-14 RX ORDER — NALBUPHINE HYDROCHLORIDE 20 MG/ML
2.5-5 INJECTION, SOLUTION INTRAMUSCULAR; INTRAVENOUS; SUBCUTANEOUS EVERY 6 HOURS PRN
Status: DISCONTINUED | OUTPATIENT
Start: 2023-07-14 | End: 2023-07-15

## 2023-07-14 RX ORDER — SODIUM CHLORIDE, SODIUM LACTATE, POTASSIUM CHLORIDE, CALCIUM CHLORIDE 600; 310; 30; 20 MG/100ML; MG/100ML; MG/100ML; MG/100ML
INJECTION, SOLUTION INTRAVENOUS CONTINUOUS
Status: DISCONTINUED | OUTPATIENT
Start: 2023-07-14 | End: 2023-07-15 | Stop reason: HOSPADM

## 2023-07-14 RX ORDER — LIDOCAINE 40 MG/G
CREAM TOPICAL
Status: DISCONTINUED | OUTPATIENT
Start: 2023-07-14 | End: 2023-07-15 | Stop reason: HOSPADM

## 2023-07-14 RX ORDER — OXYTOCIN/0.9 % SODIUM CHLORIDE 30/500 ML
1-24 PLASTIC BAG, INJECTION (ML) INTRAVENOUS CONTINUOUS
Status: DISCONTINUED | OUTPATIENT
Start: 2023-07-14 | End: 2023-07-15 | Stop reason: HOSPADM

## 2023-07-14 RX ORDER — OXYTOCIN 10 [USP'U]/ML
10 INJECTION, SOLUTION INTRAMUSCULAR; INTRAVENOUS
Status: DISCONTINUED | OUTPATIENT
Start: 2023-07-14 | End: 2023-07-15 | Stop reason: HOSPADM

## 2023-07-14 RX ORDER — DEXTROSE, SODIUM CHLORIDE, SODIUM LACTATE, POTASSIUM CHLORIDE, AND CALCIUM CHLORIDE 5; .6; .31; .03; .02 G/100ML; G/100ML; G/100ML; G/100ML; G/100ML
INJECTION, SOLUTION INTRAVENOUS CONTINUOUS
Status: DISCONTINUED | OUTPATIENT
Start: 2023-07-14 | End: 2023-07-15 | Stop reason: HOSPADM

## 2023-07-14 RX ORDER — CITRIC ACID/SODIUM CITRATE 334-500MG
30 SOLUTION, ORAL ORAL
Status: DISCONTINUED | OUTPATIENT
Start: 2023-07-14 | End: 2023-07-15 | Stop reason: HOSPADM

## 2023-07-14 RX ADMIN — FENTANYL CITRATE 50 MCG: 50 INJECTION, SOLUTION INTRAMUSCULAR; INTRAVENOUS at 19:47

## 2023-07-14 RX ADMIN — FENTANYL CITRATE 50 MCG: 50 INJECTION, SOLUTION INTRAMUSCULAR; INTRAVENOUS at 18:05

## 2023-07-14 RX ADMIN — Medication 2 MILLI-UNITS/MIN: at 23:31

## 2023-07-14 RX ADMIN — SODIUM CHLORIDE: 9 INJECTION, SOLUTION INTRAVENOUS at 18:32

## 2023-07-14 RX ADMIN — Medication: at 20:38

## 2023-07-14 RX ADMIN — ROPIVACAINE HYDROCHLORIDE 10 ML: 2 INJECTION, SOLUTION EPIDURAL; INFILTRATION at 20:40

## 2023-07-14 RX ADMIN — SODIUM CHLORIDE, POTASSIUM CHLORIDE, SODIUM LACTATE AND CALCIUM CHLORIDE: 600; 310; 30; 20 INJECTION, SOLUTION INTRAVENOUS at 20:20

## 2023-07-14 RX ADMIN — FENTANYL CITRATE 50 MCG: 50 INJECTION, SOLUTION INTRAMUSCULAR; INTRAVENOUS at 16:29

## 2023-07-14 RX ADMIN — SODIUM CHLORIDE: 9 INJECTION, SOLUTION INTRAVENOUS at 18:29

## 2023-07-14 RX ADMIN — SODIUM CHLORIDE, POTASSIUM CHLORIDE, SODIUM LACTATE AND CALCIUM CHLORIDE: 600; 310; 30; 20 INJECTION, SOLUTION INTRAVENOUS at 16:15

## 2023-07-14 ASSESSMENT — ACTIVITIES OF DAILY LIVING (ADL)
TOILETING_ISSUES: NO
FALL_HISTORY_WITHIN_LAST_SIX_MONTHS: NO
DIFFICULTY_EATING/SWALLOWING: NO
ADLS_ACUITY_SCORE: 18
CONCENTRATING,_REMEMBERING_OR_MAKING_DECISIONS_DIFFICULTY: NO
ADLS_ACUITY_SCORE: 18
DRESSING/BATHING_DIFFICULTY: NO
WALKING_OR_CLIMBING_STAIRS_DIFFICULTY: NO
ADLS_ACUITY_SCORE: 35
CHANGE_IN_FUNCTIONAL_STATUS_SINCE_ONSET_OF_CURRENT_ILLNESS/INJURY: NO
WEAR_GLASSES_OR_BLIND: NO
ADLS_ACUITY_SCORE: 18
DOING_ERRANDS_INDEPENDENTLY_DIFFICULTY: NO

## 2023-07-14 NOTE — NURSING NOTE
IPAD  used for [facility] ultrasound and office visit.  Patient reports positive fetal movement.  Report painful contractions every 5 minutes.  Has tried to call [place] but not received a call back.  Reports blood sugar values within range.  Patient denies headache, visual changes, nausea/vomiting, epigastric pain related to preeclampsia.  SBAR given to CHRISTY BORJAS, see their note in Epic.    SBAR to Neal charge RN.  Pt to be evaluated at [facility] for r/o labor.  Pt and  agreeable with plan.    Update BPP 6/8 off for breathing.  Pt uncomfortable and breathing through contractions.  Dr Sepulveda in for SVE- 6cm dilated.  Pt to [facility] immediately for labor.  Nell ARANA called charge RN with SBAR.  Pt accompanied in wheelchair by this RN and .

## 2023-07-14 NOTE — PLAN OF CARE
Data: Patient admitted to room 215 a divert pt from [place].  Pt was found to be 6cm at MFM appointment.  Patient is a . Prenatal record reviewed.   OB History    Para Term  AB Living   1 0 0 0 0 0   SAB IAB Ectopic Multiple Live Births   0 0 0 0 0      # Outcome Date GA Lbr Jf/2nd Weight Sex Delivery Anes PTL Lv   1 Current            .  Medical History:   Past Medical History:   Diagnosis Date     Gestational diabetes mellitus 2023   .  Gestational age 38w6d. Vital signs per doc flowsheet. Fetal movement present. Patient reports Laboring   as reason for admission. Support persons Joseph present.  Action: Verbal consent for EFM, external fetal monitors applied. Admission assessment completed. Patient and support persons educated on labor process. Patient instructed to report change in fetal movement, contractions, vaginal leaking of fluid or bleeding, abdominal pain, or any concerns related to the pregnancy to her nurse/physician. Patient oriented to room, call light in reach. Information gathered through interpreters , , and  with J[employer]ber  Response: Dr. Saenz at bedside informed of prenatal history and labs.  Plan continuous monitoring if given Fentanyl.  May have all standard labor medications. Plan per provider is recheck cervix in a few hours and adjust POC as needed. Patient verbalized understanding of education and verbalized agreement with plan .

## 2023-07-14 NOTE — PROVIDER NOTIFICATION
07/14/23 1845   Provider Notification   Provider Name/Title Dr. Darby   Method of Notification In Department   Request Evaluate - Remote   Notification Reason Status Update;SVE         Dr. Darby in department updated on pt status.  Blood sugars above 110, insulin started.  Pt doing well with IV pain management.

## 2023-07-14 NOTE — H&P
H&P Update    The patient was seen and her chart and nursing notes were reviewed. There have been no significant changes since she was last seen in clinic.    Isela Mcmahon is a 27 year old  at 38w6d gestation who presented from [facility] due to labor. She was there for a routine BPP. Her pregnancy has been complicated by GDMA2 on metformin, well-controlled.  She is Rh+, RI and GBS negative.     LMP 10/15/2022    SVE: 6cm at [facility]     FHT: 140s, mod noris, + accels, no decels  [place]: q3-4 min     EFW 7 lbs    A/P:    here, diverted from [facility] from [facility]. Had planned delivery at [place].      Prenatal records and labs reviewed.   GBS neg, Rh positive     Spont labor, continue expectant management. Discussed augmentation with AROM or pitocin if indicated.     GDMA2 - on metformin.   - glucose checks per protocol in active labor     Fentanyl now for analgesia, JEAN CLAUDE per patient request    Category I tracing, reassuring  Continuous monitoring.     Anticipate vaginal delivery.    Aidee Saenz MD   Obstetrics, Gynecology, and Infertility  2023

## 2023-07-15 ENCOUNTER — APPOINTMENT (OUTPATIENT)
Dept: ULTRASOUND IMAGING | Facility: CLINIC | Age: 28
End: 2023-07-15
Attending: INTERNAL MEDICINE
Payer: COMMERCIAL

## 2023-07-15 LAB
GLUCOSE BLDC GLUCOMTR-MCNC: 104 MG/DL (ref 70–99)
GLUCOSE BLDC GLUCOMTR-MCNC: 109 MG/DL (ref 70–99)
GLUCOSE BLDC GLUCOMTR-MCNC: 111 MG/DL (ref 70–99)
GLUCOSE BLDC GLUCOMTR-MCNC: 117 MG/DL (ref 70–99)
GLUCOSE BLDC GLUCOMTR-MCNC: 118 MG/DL (ref 70–99)
GLUCOSE BLDC GLUCOMTR-MCNC: 80 MG/DL (ref 70–99)
HBA1C MFR BLD: 5.7 %
HGB BLD-MCNC: 11.7 G/DL (ref 11.7–15.7)
T PALLIDUM AB SER QL: NONREACTIVE

## 2023-07-15 PROCEDURE — 120N000012 HC R&B POSTPARTUM

## 2023-07-15 PROCEDURE — 258N000003 HC RX IP 258 OP 636: Performed by: STUDENT IN AN ORGANIZED HEALTH CARE EDUCATION/TRAINING PROGRAM

## 2023-07-15 PROCEDURE — 93971 EXTREMITY STUDY: CPT | Mod: LT

## 2023-07-15 PROCEDURE — 85018 HEMOGLOBIN: CPT | Performed by: OBSTETRICS & GYNECOLOGY

## 2023-07-15 PROCEDURE — 722N000001 HC LABOR CARE VAGINAL DELIVERY SINGLE

## 2023-07-15 PROCEDURE — 250N000013 HC RX MED GY IP 250 OP 250 PS 637: Performed by: OBSTETRICS & GYNECOLOGY

## 2023-07-15 PROCEDURE — 83036 HEMOGLOBIN GLYCOSYLATED A1C: CPT | Performed by: INTERNAL MEDICINE

## 2023-07-15 PROCEDURE — 36415 COLL VENOUS BLD VENIPUNCTURE: CPT | Performed by: OBSTETRICS & GYNECOLOGY

## 2023-07-15 PROCEDURE — 0UQMXZZ REPAIR VULVA, EXTERNAL APPROACH: ICD-10-PCS | Performed by: OBSTETRICS & GYNECOLOGY

## 2023-07-15 PROCEDURE — 99221 1ST HOSP IP/OBS SF/LOW 40: CPT | Performed by: INTERNAL MEDICINE

## 2023-07-15 RX ORDER — OXYTOCIN/0.9 % SODIUM CHLORIDE 30/500 ML
340 PLASTIC BAG, INJECTION (ML) INTRAVENOUS CONTINUOUS PRN
Status: DISCONTINUED | OUTPATIENT
Start: 2023-07-15 | End: 2023-07-16 | Stop reason: HOSPADM

## 2023-07-15 RX ORDER — IBUPROFEN 400 MG/1
800 TABLET, FILM COATED ORAL EVERY 6 HOURS PRN
Status: DISCONTINUED | OUTPATIENT
Start: 2023-07-15 | End: 2023-07-16 | Stop reason: HOSPADM

## 2023-07-15 RX ORDER — TRANEXAMIC ACID 10 MG/ML
1 INJECTION, SOLUTION INTRAVENOUS EVERY 30 MIN PRN
Status: DISCONTINUED | OUTPATIENT
Start: 2023-07-15 | End: 2023-07-16 | Stop reason: HOSPADM

## 2023-07-15 RX ORDER — BISACODYL 10 MG
10 SUPPOSITORY, RECTAL RECTAL DAILY PRN
Status: DISCONTINUED | OUTPATIENT
Start: 2023-07-15 | End: 2023-07-16 | Stop reason: HOSPADM

## 2023-07-15 RX ORDER — DEXTROSE MONOHYDRATE 25 G/50ML
25-50 INJECTION, SOLUTION INTRAVENOUS
Status: DISCONTINUED | OUTPATIENT
Start: 2023-07-15 | End: 2023-07-15

## 2023-07-15 RX ORDER — MODIFIED LANOLIN
OINTMENT (GRAM) TOPICAL
Status: DISCONTINUED | OUTPATIENT
Start: 2023-07-15 | End: 2023-07-16 | Stop reason: HOSPADM

## 2023-07-15 RX ORDER — OXYTOCIN 10 [USP'U]/ML
10 INJECTION, SOLUTION INTRAMUSCULAR; INTRAVENOUS
Status: DISCONTINUED | OUTPATIENT
Start: 2023-07-15 | End: 2023-07-16 | Stop reason: HOSPADM

## 2023-07-15 RX ORDER — ACETAMINOPHEN 325 MG/1
650 TABLET ORAL EVERY 4 HOURS PRN
Status: DISCONTINUED | OUTPATIENT
Start: 2023-07-15 | End: 2023-07-16 | Stop reason: HOSPADM

## 2023-07-15 RX ORDER — MISOPROSTOL 200 UG/1
800 TABLET ORAL
Status: DISCONTINUED | OUTPATIENT
Start: 2023-07-15 | End: 2023-07-16 | Stop reason: HOSPADM

## 2023-07-15 RX ORDER — DEXTROSE MONOHYDRATE 25 G/50ML
25-50 INJECTION, SOLUTION INTRAVENOUS
Status: DISCONTINUED | OUTPATIENT
Start: 2023-07-15 | End: 2023-07-16 | Stop reason: HOSPADM

## 2023-07-15 RX ORDER — NICOTINE POLACRILEX 4 MG
15-30 LOZENGE BUCCAL
Status: DISCONTINUED | OUTPATIENT
Start: 2023-07-15 | End: 2023-07-16 | Stop reason: HOSPADM

## 2023-07-15 RX ORDER — DOCUSATE SODIUM 100 MG/1
100 CAPSULE, LIQUID FILLED ORAL DAILY
Status: DISCONTINUED | OUTPATIENT
Start: 2023-07-15 | End: 2023-07-16 | Stop reason: HOSPADM

## 2023-07-15 RX ORDER — METHYLERGONOVINE MALEATE 0.2 MG/ML
200 INJECTION INTRAVENOUS
Status: DISCONTINUED | OUTPATIENT
Start: 2023-07-15 | End: 2023-07-16 | Stop reason: HOSPADM

## 2023-07-15 RX ORDER — HYDROCORTISONE 25 MG/G
CREAM TOPICAL 3 TIMES DAILY PRN
Status: DISCONTINUED | OUTPATIENT
Start: 2023-07-15 | End: 2023-07-16 | Stop reason: HOSPADM

## 2023-07-15 RX ORDER — MISOPROSTOL 200 UG/1
400 TABLET ORAL
Status: DISCONTINUED | OUTPATIENT
Start: 2023-07-15 | End: 2023-07-16 | Stop reason: HOSPADM

## 2023-07-15 RX ORDER — CARBOPROST TROMETHAMINE 250 UG/ML
250 INJECTION, SOLUTION INTRAMUSCULAR
Status: DISCONTINUED | OUTPATIENT
Start: 2023-07-15 | End: 2023-07-16 | Stop reason: HOSPADM

## 2023-07-15 RX ORDER — NICOTINE POLACRILEX 4 MG
15-30 LOZENGE BUCCAL
Status: DISCONTINUED | OUTPATIENT
Start: 2023-07-15 | End: 2023-07-15

## 2023-07-15 RX ADMIN — DOCUSATE SODIUM 100 MG: 100 CAPSULE, LIQUID FILLED ORAL at 08:49

## 2023-07-15 RX ADMIN — ACETAMINOPHEN 650 MG: 325 TABLET, FILM COATED ORAL at 08:49

## 2023-07-15 RX ADMIN — ACETAMINOPHEN 650 MG: 325 TABLET, FILM COATED ORAL at 04:09

## 2023-07-15 RX ADMIN — SODIUM CHLORIDE, POTASSIUM CHLORIDE, SODIUM LACTATE AND CALCIUM CHLORIDE: 600; 310; 30; 20 INJECTION, SOLUTION INTRAVENOUS at 00:27

## 2023-07-15 RX ADMIN — IBUPROFEN 800 MG: 400 TABLET ORAL at 04:09

## 2023-07-15 ASSESSMENT — ACTIVITIES OF DAILY LIVING (ADL)
ADLS_ACUITY_SCORE: 18

## 2023-07-15 NOTE — ANESTHESIA PROCEDURE NOTES
"Epidural catheter Procedure Note    Pre-Procedure   Staff -        Anesthesiologist:  Juliocesar Yap MD       Performed By: anesthesiologist       Location: OB       Pre-Anesthestic Checklist: patient identified, IV checked, risks and benefits discussed, informed consent, monitors and equipment checked, pre-op evaluation and at physician/surgeon's request  Timeout:       Correct Patient: Yes        Correct Procedure: Yes        Correct Site: Yes        Correct Position: Yes   Procedure Documentation  Procedure: epidural catheter       Patient Position: sitting       Patient Prep/Sterile Barriers: sterile gloves, mask, patient draped       Skin prep: Betadine       Local skin infiltrated with 3 mL of 1% lidocaine.        Insertion Site: L3-4. (midline approach).       Technique: LORT saline        MARJORIE at 5 cm.       Needle Type: ToXinrongy needle       Needle Gauge: 17.        Needle Length (Inches): 3.5        Catheter: 19 G.          Catheter threaded easily.         4 cm epidural space.         Threaded 9 cm at skin.         # of attempts: 1 and  # of redirects:          : 0.    Assessment/Narrative         Paresthesias: No.       Test dose of 3 mL lidocaine 1.5% w/ 1:200,000 epinephrine at.         Test dose negative, 3 minutes after injection, for signs of intravascular, subdural, or intrathecal injection.       Insertion/Infusion Method: LORT saline       Aspiration negative for Heme or CSF via Epidural Catheter.    Medication(s) Administered   0.2% Ropivacaine (Epidural) - EPIDURAL   10 mL - 7/14/2023 8:40:00 PM   Comments:  Pt tolerated well. No complications.   Catheter taped sterile and securely with sterile medical adhesive spray and tegaderm.   Pt placed back in supine with DES.   FHTs stable post-procedure.        FOR [facility] ([facility]) ONLY:   Pain Team Contact information: please page the Pain Team Via [employer]. Search \"Pain\". During daytime hours, please page the attending first. At night please " page the resident first.

## 2023-07-15 NOTE — PROGRESS NOTES
Patient is Polish speaking only. SO is at bedside. She has voided twice and bleeding is minimal. Firm at U. No clots. Vs stable, Motrin and tylenol for pain management.  Her blood sugar at 0300 was 109 then 0600 118. Dr. Darby was notified was sugars, and said no more BS checks. Continue to monitor.

## 2023-07-15 NOTE — ANESTHESIA PREPROCEDURE EVALUATION
Anesthesia Pre-Procedure Evaluation    Patient: Isela Mcmahon   MRN: [id number] : 1995        Procedure :           Past Medical History:   Diagnosis Date     Gestational diabetes mellitus 2023      History reviewed. No pertinent surgical history.   No Known Allergies   Social History     Tobacco Use     Smoking status: Never     Smokeless tobacco: Never   Substance Use Topics     Alcohol use: Not Currently      Wt Readings from Last 1 Encounters:   23 63.5 kg (140 lb)        Anesthesia Evaluation            ROS/MED HX  ENT/Pulmonary:    (-) asthma   Neurologic:  - neg neurologic ROS     Cardiovascular:    (-) PIH   METS/Exercise Tolerance:     Hematologic:     (+) no anticoagulation therapy, no coagulopathy,     Musculoskeletal:       GI/Hepatic:     (+) GERD,     Renal/Genitourinary:       Endo:     (+) gestational diabetes,    Psychiatric/Substance Use:       Infectious Disease:       Malignancy:       Other:            Physical Exam    Airway        Mallampati: II   TM distance: > 3 FB   Neck ROM: full     Respiratory Devices and Support         Dental  no notable dental history         Cardiovascular   cardiovascular exam normal          Pulmonary   pulmonary exam normal                OUTSIDE LABS:  CBC:   Lab Results   Component Value Date    HGB 14.0 2023     BMP:   Lab Results   Component Value Date     (H) 2023     (H) 2023     COAGS: No results found for: PTT, INR, FIBR  POC: No results found for: BGM, HCG, HCGS  HEPATIC: No results found for: ALBUMIN, PROTTOTAL, ALT, AST, GGT, ALKPHOS, BILITOTAL, BILIDIRECT, LISA  OTHER: No results found for: PH, LACT, A1C, MOIRA, PHOS, MAG, LIPASE, AMYLASE, TSH, T4, T3, CRP, SED    Anesthesia Plan    ASA Status:  2      Anesthesia Type: Epidural.              Consents    Anesthesia Plan(s) and associated risks, benefits, and realistic alternatives discussed. Questions answered and patient/representative(s)  expressed understanding.    - Discussed:     - Discussed with:  Patient         Postoperative Care            Comments:    Other Comments: Orders to manage the epidural infusion have been entered, and through coordination with the nurse, we will continute to manage and monitor the patient's labor epidural.  We will continuously be available to adjust as needed thruout the entire L&D process.             Juliocesar Yap MD

## 2023-07-15 NOTE — PLAN OF CARE
Report given to Meme RN and Laly RN.  Will assume care.    Through  SP34 reviewed POC with pt and S.O.  Introduced pt to next nurses. Reviewed AROM during SVE. Dr. Darby at bedside  AROM-light [facility].  Explained about extra people at delivery.  Reviewed 4 part bands.  Pt agrees to baby medications.  Reviewed standard baby cares right after delivery with patient being diabetic.

## 2023-07-15 NOTE — PLAN OF CARE
English speaking. Needs . Partner Joseph at bedside and supportive. VSS on RA, however, softer BP's this shift. Notified on-call MD and she said to just watch blood pressures for now and notify her if BP's worsen or she becomes symptomatic. Currently denies dizziness and patient is eating and voiding appropriately. Fundus at U and firm. Lochia is small and rubra. Breastfeeding well with baby, but needs encouragement to start feedings. Hx of GDM so hospitalist consult pending. Dr. Walters ordered A1C add on and placed ACHS blood sugar for now. Pre meal blood sugar 80 and no coverage needed.

## 2023-07-15 NOTE — PROVIDER NOTIFICATION
"Paged On-call Korin Gerber the following:     \"FYI, pt's BP's have been 87-90s/50-55s. Asymptomatic. Voiding and vaginal OP small. ALSO, hospitalists wants to know if u still want consult since BG checks have been stopped.\"     Per Dr Gerber: Continue to monitor vitals and notify her if she becomes symptomatic. Also, still have hospitalist continue with consult since fasting BG was 118  "

## 2023-07-15 NOTE — PROVIDER NOTIFICATION
07/14/23 2015 07/14/23 2030 07/14/23 2035   Epidural Placement Care   Epidural Request/Placement provider called for placement anesthesiologist at bedside time out performed   Anesthesiologist/CRNA Dr. Dori Meadows      07/14/23 2039   Epidural Placement Care   Epidural Request/Placement epidural test   Anesthesiologist/CRNA Dr. Meadows

## 2023-07-15 NOTE — CONSULTS
[facility]    Hospitalist Consultation    Date of Admission:  2023  Date of Consult (When I saw the patient): 07/15/23    Assessment & Plan   Isela Mcmahon is a 27 year old female who was admitted on 2023. I was asked to see the patient for assistance with elevated blood glucose levels.  Current problems include:    Gestational diabetes; sugars variable,and lower this afternoon.  - check Hgb A1C -> 5.7% (pre-diabetes)  - add mod-consistent CHO diet  - add QID blood sugar checks today;  - cover with sliding scale insulin at meal times only  - holding pre-admit metformin for now; may need lower doses at time of discharge  - needs fasting lipid check 1-2 weeks after discharge    Recent Left lower leg varicosities, with some discomfort just before admission.  - check venous US to evaluate for DVT      DVT Prophylaxis: Pneumatic Compression Devices and ambulate every shift, and per Primary team.  Code Status: Prior    Disposition: per primary team        KAYLIN Walters MD, FACP     [facility] Hospitalist    Reason for Consult   Reason for consult: the Hospitalist service has been asked by Ob/Gyn to evaluate this patient for hyperglycemia/gestational diabetes.    Primary Care Physician   Heidi Shell    Chief Complaint   Admitted for labor.     History was obtained from the patient and from EHR.    History of Present Illness   Isela Mcmahon is a pleasant 27 year old woman,  at 39w0d, admitted for labor. She was being seen in the [facility] clinic and was found to be elaine and 6 cm dilated. Her pregnancy was complicated by GDMA2 on metformin, well controlled.  She was on an insulin drip during labor, but her blood sugars were never above 130, and just prior to delivery blood glucose was 104.      Past Medical History    I have reviewed this patient's medical history and updated it with pertinent information if needed.   Past Medical History:   Diagnosis Date      Gestational diabetes mellitus 07/14/2023       Past Surgical History   I have reviewed this patient's surgical history and updated it with pertinent information if needed.  History reviewed. No pertinent surgical history.    Prior to Admission Medications   Prior to Admission Medications   Prescriptions Last Dose Informant Patient Reported? Taking?   Prenatal Vit-Fe Fumarate-FA (PNV PRENATAL PLUS MULTIVITAMIN) 27-1 MG TABS per tablet Past Week  Yes Yes   Sig: Take 1 tablet by mouth daily   VITAMIN D PO Past Week  Yes Yes   Sig: Take 1 capsule by mouth daily   metFORMIN (GLUCOPHAGE) 1000 MG tablet 7/13/2023  Yes Yes   Sig: Take 1,000 mg by mouth 2 times daily (with meals)      Facility-Administered Medications: None     Allergies   No Known Allergies    Social History   I have reviewed this patient's social history and updated it with pertinent information if needed. Isela Mcmahon  reports that she has never smoked. She has never used smokeless tobacco. She reports that she does not currently use alcohol. She reports that she does not use drugs.    Family History   I have reviewed this patient's family history and updated it with pertinent information if needed.   History reviewed. No pertinent family history.   - no D.M.    Review of Systems   The 10 point Review of Systems is negative other than noted in the HPI or here.  Has had some mild vaginal bleeding this afternoon/early; no nausea or cramping; no abdominal pain.  Appetite good.    Physical Exam   Temp: 97.9  F (36.6  C) Temp src: Oral BP: 97/57 Pulse: 65   Resp: 16 SpO2: 98 % O2 Device: None (Room air)    Vital Signs with Ranges  Temp:  [97.3  F (36.3  C)-99.6  F (37.6  C)] 97.9  F (36.6  C)  Pulse:  [59-65] 65  Resp:  [14-16] 16  BP: ()/(50-72) 97/57  SpO2:  [95 %-99 %] 98 %  140 lbs 0 oz    Constitutional: NAD; up walking in room  Eyes: sclerae clear  HEENT: MM's moist  Respiratory: good a/e bilaterally; no wheezing or rhonchi  Cardiovascular:  RRR; S1, S2 noted; no m/r/g  GI: abdomen protuberant; + BS; NT/ND  Skin: multiple purplish varicosities Left lower leg, below knee; slightly tender  Musculoskeletal: no edema  Neurologic: awake, conversant; no focal deficits  Psychiatric: appears euthymic, cheerful.    Data   Data reviewed today: All pertinent laboratory and imaging results from this encounter were reviewed.     Recent Labs   Lab 07/15/23  1359 07/15/23  0752 07/15/23  0557 07/15/23  0303 07/14/23  1810 07/14/23  1700   HGB  --  11.7  --   --   --  14.0   GLC 80  --  118* 109*   < >  --     < > = values in this interval not displayed.

## 2023-07-15 NOTE — L&D DELIVERY NOTE
OB Delivery Summary    Isela Mcmahon is a 27 year old  at 39w0d, admitted for labor. She was being seen in the [facility] clinic and was found to be elaine and 6 cm dilated. Her pregnancy is complicated by GDMA2 on metformin, well controlled.     She presented in spontaneous labor. She received an epidural for anesthesia. AROM performed at 22:45 with meconium stained fluid. She progressed to completely dilated at that time. Pitocin was started for augmentation because contractions were 7-10 minutes apart. She was on an insulin drip during labor, but her blood sugars were never above 130, and just prior to delivery blood glucose was 104. She pushed effectively for just over two hours. FHT was Category 2 with intermittent late and variable decelerations but came back up to baseline of 125 with moderate variability. Infant delivered spontaneously over an intact perineum at 00:57 on 7/15/23. Shoulders delivered easily. Infant with spontaneous cry. Placed on mother's chest. Apgars were 8 and 9 at 1 and 5 minutes, respectively. Viable male infant. Weight 6 lb 0 oz. The cord was doubly clamped and cut after 60 seconds. Placenta delivered with gentle cord traction; noted to be intact with 3 vessel cord. She had a periurethral laceration that was repaired in standard fashion with 3-0 Vicryl. Hemostasis was achieved. Fundus firm and lochia minimal at the end of the case.  mL. Patient tolerated the procedure well and was transferred to postpartum. Infant remained with mother for the transition period.     Final diagnosis:  1. Spontaneous vaginal delivery of viable male infant at 39w0d  2. Continuous lumbar epidural  3. Periurethral laceration, repaired    Sara Darby MD  OB/GYN & Infertility  Pager [phone]  07/15/23

## 2023-07-15 NOTE — PLAN OF CARE
Pt states through   would like more Fentanyl.  As was given medication pt requesting Epidural.  Fluid bolus started.

## 2023-07-15 NOTE — ANESTHESIA POSTPROCEDURE EVALUATION
Patient: Isela Mcmahon    Procedure: * No procedures listed *       Anesthesia Type:  Epidural    Note:     Postop Pain Control:    PONV:    Neuro/Psych:    Airway/Respiratory:    CV/Hemodynamics:    Other NRE:    DID A NON-ROUTINE EVENT OCCUR?     Event details/Postop Comments:  Unable to contact patient for epidural follow up           Last vitals:  Vitals:    07/15/23 1206 07/15/23 1401 07/15/23 1517   BP: (!) 87/51 97/57    Pulse: 59 62 65   Resp:  16 16   Temp: 36.3  C (97.3  F) 36.3  C (97.4  F) 36.6  C (97.9  F)   SpO2:          Electronically Signed By: Elder Melchor MD  July 15, 2023  3:29 PM

## 2023-07-16 VITALS
HEIGHT: 59 IN | OXYGEN SATURATION: 98 % | SYSTOLIC BLOOD PRESSURE: 131 MMHG | BODY MASS INDEX: 28.22 KG/M2 | HEART RATE: 86 BPM | DIASTOLIC BLOOD PRESSURE: 77 MMHG | WEIGHT: 140 LBS | TEMPERATURE: 97.5 F | RESPIRATION RATE: 17 BRPM

## 2023-07-16 LAB
GLUCOSE BLDC GLUCOMTR-MCNC: 107 MG/DL (ref 70–99)
GLUCOSE BLDC GLUCOMTR-MCNC: 91 MG/DL (ref 70–99)
GLUCOSE BLDC GLUCOMTR-MCNC: 92 MG/DL (ref 70–99)

## 2023-07-16 PROCEDURE — 99232 SBSQ HOSP IP/OBS MODERATE 35: CPT | Performed by: INTERNAL MEDICINE

## 2023-07-16 RX ORDER — ACETAMINOPHEN 500 MG
500-1000 TABLET ORAL EVERY 6 HOURS PRN
Qty: 60 TABLET | Refills: 1 | Status: SHIPPED | OUTPATIENT
Start: 2023-07-16 | End: 2023-08-16

## 2023-07-16 RX ORDER — AMOXICILLIN 250 MG
1 CAPSULE ORAL 2 TIMES DAILY
Qty: 60 TABLET | Refills: 1 | Status: SHIPPED | OUTPATIENT
Start: 2023-07-16 | End: 2023-08-16

## 2023-07-16 RX ORDER — IBUPROFEN 600 MG/1
600 TABLET, FILM COATED ORAL EVERY 6 HOURS PRN
Qty: 60 TABLET | Refills: 1 | Status: SHIPPED | OUTPATIENT
Start: 2023-07-16 | End: 2023-08-16

## 2023-07-16 ASSESSMENT — ACTIVITIES OF DAILY LIVING (ADL)
ADLS_ACUITY_SCORE: 18

## 2023-07-16 NOTE — PLAN OF CARE
Goal Outcome Evaluation:      Plan of Care Reviewed With: patient, spouse    Overall Patient Progress: improvingOverall Patient Progress: improving     Vital signs are stable.  Denies dizziness and patient is eating and voiding appropriately. Fundus at U and firm. Lochia is small and rubra. Ambulates independently.  Dr. Walters saw patient around 2000 and is aware that patient didn't call for BG check before dinner at 2000.  He wants BG around 2200 an holds insulin order.   Breastfeeding well with baby, but needs encouragement to start feedings. Hx of GDM so hospitalist consult pending. Continue ACHS blood sugar for now. She denies pain and declines pain medication.  She went to US for DVT.  Latvian speaking.  Needs  all the time.  Questions answered.  Continue current plan of care.

## 2023-07-16 NOTE — PLAN OF CARE
VSS on RA. Denies dizziness. Up independently in room. Voiding adequately. Tolerating moderate carb diet. ACHS blood sugar checks. No sliding scale insulin needed. Will discharge later today. Plan to discuss AVS prior to discharge.

## 2023-07-16 NOTE — PROGRESS NOTES
"[facility] Obstetrics Postpartum Progress Note  2023     S: Pt doing well. Pain is well controlled. Bleeding Light. Infant is being . Voiding spontaneously.    O:  /55 (BP Location: Right arm)   Pulse 67   Temp 97.8  F (36.6  C) (Oral)   Resp 16   Ht 1.499 m (4' 11\")   Wt 63.5 kg (140 lb)   LMP 10/15/2022   SpO2 98%   Breastfeeding Unknown   BMI 28.28 kg/m     Gen: healthy, alert and no distress    Resp: nonlabored breathing  Abd: soft, nondistended, appropriately TTP, FF at U  Ext: non-tender, 1+ edema    Hemoglobin   Date Value Ref Range Status   07/15/2023 11.7 11.7 - 15.7 g/dL Final   2023 14.0 11.7 - 15.7 g/dL Final     Lab Results   Component Value Date    AS Negative 2023   Fasting blood glucose 107  A POS     A: 27 year old  PPD#1 s/p    GDMA2, elevated fasting blood glucose    P:   Routine postpartum cares.    Continue lower dose of metformin postpartum, dx with 500 mg BID  Discharge later today if baby able to be discharged, orders done      Lurdes Roy MD   Obstetrics, Gynecology, and Infertility          "

## 2023-07-16 NOTE — PLAN OF CARE
Goal Outcome Evaluation:      Plan of Care Reviewed With: patient, spouse    Overall Patient Progress: improvingOverall Patient Progress: improving       VSS, FF, scant bleeding, voiding ok, + flatus, tolerating a regular diet, and up ad steffanie. Pt has denied pain through the shift. She is breastfeeding well with minimal to no assistance. All questions and concerns answered by this RN through the . Will continue to monitor.

## 2023-07-16 NOTE — DISCHARGE INSTRUCTIONS
Warning Signs after Having a Baby    Keep this paper on your fridge or somewhere else where you can see it.    Call your provider if you have any of these symptoms up to 12 weeks after having your baby.    Thoughts of hurting yourself or your baby  Pain in your chest or trouble breathing  Severe headache not helped by pain medicine  Eyesight concerns (blurry vision, seeing spots or flashes of light, other changes to eyesight)  Fainting, shaking or other signs of a seizure    Call 9-1-1 if you feel that it is an emergency.     The symptoms below can happen to anyone after giving birth. They can be very serious. Call your provider if you have any of these warning signs.    My provider s phone number: _______________________    Losing too much blood (hemorrhage)    Call your provider if you soak through a pad in less than an hour or pass blood clots bigger than a golf ball. These may be signs that you are bleeding too much.    Blood clots in the legs or lungs    After you give birth, your body naturally clots its blood to help prevent blood loss. Sometimes this increased clotting can happen in other areas of the body, like the legs or lungs. This can block your blood flow and be very dangerous.     Call your provider if you:  Have a red, swollen spot on the back of your leg that is warm or painful when you touch it.   Are coughing up blood.     Infection    Call your provider if you have any of these symptoms:  Fever of 100.4 F (38 C) or higher.  Pain or redness around your stitches if you had an incision.   Any yellow, white, or green fluid coming from places where you had stitches or surgery.    Mood Problems (postpartum depression)    Many people feel sad or have mood changes after having a baby. But for some people, these mood swings are worse.     Call your provider right away if you feel so anxious or nervous that you can't care for yourself or your baby.    Preeclampsia (high blood pressure)    Even if you  didn't have high blood pressure when you were pregnant, you are at risk for the high blood pressure disease called preeclampsia. This risk can last up to 12 weeks after giving birth.     Call your provider if you have:   Pain on your right side under your rib cage  Sudden swelling in the hands and face    Remember: You know your body. If something doesn't feel right, get medical help.     For informational purposes only. Not to replace the advice of your health care provider. Copyright 2020 [facility]. All rights reserved. Clinically reviewed by Glenys Espinal, RNC-OB, MSN. [employer] 780470 - Rev 02/23.

## 2023-07-16 NOTE — PROGRESS NOTES
[facility]    Hospitalist Progress Note    Date of Service (when I saw the patient): 07/16/2023    Assessment & Plan   Isela Mcmahon is a pleasant 27 year old woman who was admitted on 7/14/2023. I was asked to see the patient for assistance with elevated blood glucose levels.  Current problems include:     Gestational diabetes; sugars variable, controlled.  - check Hgb A1C -> 5.7% (pre-diabetes)  - continue mod-consistent CHO diet after discharge, if able  - continue QID blood sugar checks today; change to AC TID at discharge  - continue sliding scale insulin at meal times only  - holding pre-admit metformin for now; agree with decreasing to 500 mg BID at time of discharge  - needs fasting lipid check 1-2 weeks after discharge  -> follow up with PCP 1 week; may be able to decrease further or discontinue metformin altogether  -> reviewed equipment w/ Isela today: she says she has glucometer, lancets, etc. At home  -> encourage her to continue checking sugars at least BID/TID after discharge and record levels for review w/ PCP next week.     Recent Left lower leg varicosities, with some discomfort just before admission.  - checked venous US 7/15 to evaluate for DVT -> negative; see below  - if pain/varicosities return/persist, follow up with PCP        DVT Prophylaxis: Pneumatic Compression Devices and ambulate every shift, and per Primary team.  Code Status: Prior    Disposition: per primary team.      KAYLIN Walters MD, FACP     [facility] Hospitalist  Text Page (7am - 6pm)    Interval History   Reviewed care w/ RN.  No new issues overnight; appetite stable.  Baby Alan has been nursing well.  No leg pain today.    Data reviewed today: I reviewed all new labs and imaging results over the last 24 hours.     Physical Exam   Temp: 97.8  F (36.6  C) Temp src: Oral BP: 106/55 Pulse: 67   Resp: 16   O2 Device: None (Room air)    Vitals:    07/14/23 1724   Weight: 63.5 kg (140 lb)      Vital Signs with Ranges  Temp:  [97.3  F (36.3  C)-97.9  F (36.6  C)] 97.8  F (36.6  C)  Pulse:  [62-87] 67  Resp:  [16] 16  BP: ()/(48-57) 106/55  I/O last 3 completed shifts:  In: 480 [P.O.:480]  Out: -     Constitutional: NAD; sitting in chair  Eyes: sclerae clear  HEENT: MM's moist  Respiratory: good a/e bilaterally; no wheezing or rhonchi  Cardiovascular: RRR; S1, S2 noted; no m/r/g  GI: abdomen protuberant; + BS; NT/ND  Skin: (7/15: multiple purplish varicosities Left lower leg, below knee; slightly tender)  Musculoskeletal: no edema  Neurologic: awake, conversant; no focal deficits  Psychiatric: appears euthymic, cheerful.      Medications     - MEDICATION INSTRUCTIONS -       - MEDICATION INSTRUCTIONS -       oxytocin in 0.9% NaCl         docusate sodium  100 mg Oral Daily     insulin aspart  1-7 Units Subcutaneous TID AC     [Held by provider] insulin aspart  1-5 Units Subcutaneous At Bedtime       Data   Recent Labs   Lab 07/16/23  1204 07/16/23  0745 07/16/23  0203 07/15/23  1359 07/15/23  0752 07/14/23  1810 07/14/23  1700   HGB  --   --   --   --  11.7  --  14.0   GLC 91 107* 92   < >  --    < >  --     < > = values in this interval not displayed.       Recent Results (from the past 24 hour(s))   US Lower Extremity Venous Duplex Left    Narrative    EXAM: US LOWER EXTREMITY VENOUS DUPLEX LEFT  LOCATION: [facility]  DATE: 7/15/2023    INDICATION: Please eval. re.: varicosities on Left pretibial area upper calf; was painful just before her delivery in the last several days.    COMPARISON: None.  TECHNIQUE: Venous Duplex ultrasound of the left lower extremity with and without compression, augmentation and duplex. Color flow and spectral Doppler with waveform analysis performed.    FINDINGS: Exam includes the common femoral, femoral, popliteal, and contralateral common femoral veins as well as segmentally visualized deep calf veins and greater saphenous vein.     LEFT: No  deep vein thrombosis. No superficial thrombophlebitis. No popliteal cyst. Patent varicosities are present.      Impression    IMPRESSION:  1.  No deep venous thrombosis in the left lower extremity.

## 2023-07-20 ENCOUNTER — LACTATION ENCOUNTER (OUTPATIENT)
Age: 28
End: 2023-07-20

## 2023-07-20 NOTE — LACTATION NOTE
This note was copied from a baby's chart.  Lactation Note    Reason for visit:    Help with latching, initiate pumping    Supply:    Pumped 80ml at 11am, unsure of prior pumping time.  Pumping at 1400-- about 20ml.    Equipment changes:    Started nipple shield    Latching:    Attempted; babe frantic, disorganized, unable to effectively latch to breast without or with a shield, with both underarm, cradle and cross cradle.  Attempted to organize babe with gloved finger, pacifier, unable to settle and suck.  Due to tie constraints, babe put back to bed, placed under bili lights, gavage started, pumping initiated..    Education given:    Importance of pumping frequency.    Plan:    Return visit today to complete admission.    Beryl Correa, RNC-DELIA, IBCLC   Lactation Consultant  Ascom: *[phone]  Office: [phone]

## 2023-07-20 NOTE — LACTATION NOTE
"This note was copied from a baby's chart.  Lactation Admission Note      Baby Information:  Infant's first name:  Addy  Infant medical history: hyperbili, SGA, 11% weight loss from birth, poor feeding     needed? Yes; language needed: [facility]    Lactation goal (if known): \"help my baby breastfeed better\"  Lactation history: 1st baby    Mother's Information: Name: Isela  Occupation:    Age: 27  Delivery type: vaginal   [place]: [place]  Pump for home use: Yes, Pump in Style    Partner's name:    Occupation:      Relevant maternal medical and social hx:     Maternal past medical history, problem list and prior to admission medications reviewed and notable for diabetes, metformin use        Relevant maternal medications:     Did not ask current meds    Maternal risk factors:  Diabetes (type) unknown     Admission Education given:  []Admission packet  []Kangaroo care  []Benefits of breast milk  []How breast milk is made  []Stages of milk production  []Milk supply/ goal volumes  []Hand expression  []Hands-on pumping  []Collecting, labeling, transporting milk  []Cleaning, sanitizing pump parts  []Storage of milk      Beryl Correa, RNC-DELIA, IBCLC   Lactation Consultant  Ascom: *[phone]  Office: [phone]    "

## 2023-07-21 NOTE — LACTATION NOTE
This note was copied from a baby's chart.  Lactation Follow Up Note    Reason for visit:    Latch support and to finish admission teaching    Supply:    At 1820, pumped at baby's bedside after breastfeeding attempt. Mom pumped about 6 mL.    Equipment changes:    Mom asked not to use the nipple shield at first, but eventually it was tried.     Hands free pumping bra provided    Latching:    Attempted to latch baby. He was frantic and difficult to soothe. Was able to calm him by letting him suck on a gloved finger, but he quickly became frantic and disorganized at the breast. Latch was attempted on both sides in a cross-cradle hold, and then in a hold mom did with baby more upright. He latched and sucked a few times, but did not sustain. After 15 minutes of trying, he was gavage fed and placed back under phototherapy.    Education given:    Khmer NICU book, highlighting helpful and important pages, pumping log use, breastmilk storage and labeling, pumping frequency and pump usage, lactation support while baby is inpatient.     All teaching was done with a  over an iPad.     Plan:    Pump every 3 hours. Attempt breastfeeding when mom is here with baby and pump afterwards. Have RN call lactation if/when mom has questions.     Monica Christiansen, RN, IBCLC   Lactation Consultant  Ascom: *[phone]  Office: [phone]

## 2023-07-25 ENCOUNTER — LACTATION ENCOUNTER (OUTPATIENT)
Age: 28
End: 2023-07-25

## 2023-07-25 NOTE — LACTATION NOTE
This note was copied from a baby's chart.  I met with mom for discharge teaching (see prior note for topics) and gave her pertinent handouts.  Teaching completed, all questions answered and she verbalizes readiness to go home.  Encouraged seeking outpatient support as needed.  Lactation discharge teaching completed 07/25/23  at 3:03 PM

## 2023-07-25 NOTE — LACTATION NOTE
"This note was copied from a baby's chart.  LACTATION DISCHARGE INSTRUCTIONS            OTHER DISCHARGE INFORMATION    Medications:   Some women may find certain types of hormonal birth control, decongestants or antihistamines may impact supply-- talk to your provider.  Always get a second opinion from a lactation consultant or a provider familiar with lactation if told to stop latching or \"pump and dump\" when starting a new medication, having a procedure or you are ill; most of the time things are compatible.        TRANSITIONING TO MORE FEEDINGS AT HOME    Often, babies go home from the NICU doing a combination of breastfeeding and bottle feeding.  With time and patience, most will go on to nurse most or all their feedings.  infants, in particular, may not be able to fully nurse until at or after their due date. To ensure your baby is taking adequate volumes, some babies may need supplemental bottle after breastfeeding. Keep these things in mind as you nurse your baby at home:    Good time management is key!  Make feedings efficient so you have time to eat, sleep, and pump.    It is important to latch your baby frequently, even if he or she is taking small amounts. Staying skin to skin will also help keep your baby \"breast oriented\".  Going days without latching will make it more difficult.  Babies can be re-taught how to latch, but this is very time consuming and not always successful.        Please see a lactation consultant ASAP if you are not meeting your latching goal.  It is easier to make changes now, versus weeks or months down the road.      HOW TO WEAN FROM THE NIPPLE SHIELD    Many NICU babies use a nipple shield for a period of time, especially premature babies and babies recovering from illness or surgery.  It helps them stay latched on and get milk more easily.    How do you know it's time to try off the nipple shield?    Your baby is waking on their own before feedings  Your baby is able to " stay awake during the entire feeding, without a lot of encouragement to stay awake  Your baby's suck is significantly stronger   Your baby is taking full feedings at the breast  Typically, at or after their due date    How do I wean off the nipple shield?    Start the feeding with the nipple shield in place, then take the nipple shield off detention through the feeding and re-latch  Try at feedings where your baby is calm, not when they are frantically hungry  Middle of the night can be a good time to try, when everyone is relaxed  https://www.[employer].Daqi/blog/my-ten-step-process-for-weaning-from-the-nipple-shield/    How do I know my baby is eating well without the nipple shield?    They seem satisfied after feeding  Your breasts feels softer after the feeding  Your baby has enough wet and soiled diapers  If using a breastfeeding scale-- the numbers on the scale are similar to a feeding with a nipple shield  If you have problems getting off the nipple shield, please make an appointment with a lactation consultant.      HOW TO WEAN FROM THE PUMP (AFTER YOUR BABY TAKES A FULL BREASTFEEDING)    Your milk supply may be greater than what your baby needs after discharge. It is important that you gradually wean from pumping after your baby takes a full breastfeeding (without needing a top-off).  If you wean too quickly, you will be uncomfortable and you run the risk of causing your supply to drop.    If you have been pumping less than two weeks:    If you are uncomfortable after a full breastfeeding, pump only until you are comfortable (versus pumping until empty)      If you have been pumping two weeks or more:    Continue to pump after every breastfeeding, but gradually decrease the time or volume you pump.   Example based on time: If you have been pumping 20 minutes after each full breastfeeding, decrease to 18 minutes for two days. If still comfortable, decrease to 16 minutes for another two days.   Example  "based on volume: If you normally pump 2 oz after a feeding, pump 1.75 oz for a few days, 1.5 oz for a few days, etc  Continue this way until you no longer need to pump (after breastfeeding).    Remember that if you are bottle feeding some feedings, you need to pump at the time you would have latched your baby. If you do not, you might start decreasing your milk supply.    OTHER LATCHING INFORMATION    Growth Spurts: Common times for \"growth spurts\" are around 7-10 days, 2-3 weeks, 4-6 weeks, 3 months, 4 months, 6 months and 9 months, but these vary widely between babies.  During these times allow your baby to nurse very frequently (or pump more frequently) to temporarily boost your supply, as opposed to supplementing.  It should pass in a few days when your supply increases, and your baby will settle into a new feeding pattern.    How to get a breastfeeding test weight scale:   Rental (2ml sensitivity):   [employer] [facility]) [phone]   [place] [employer] ([facility]) [phone]  [place] [employer]) [phone]   Purchase scale (6ml sensitivity):   \"[employer]ello Baby Scale\" ([employer], Amazon, etc), around $150      LACTATION SUPPORT    [place] Lactation Resources:   HAILEY Galarza, CNYESSICA, IBCLC  Tuesday:  [facility],  8:30 - 5:00,  [phone]  Wednesday:  [facility], 7th floor, 8:30 - 4:00, [phone]  Thursday:  [facility], [facility], 8:30 - 4:00,  [phone]    Breastfeeding Connection at [facility]  [phone]   Breastfeeding Connection at [facility]   [phone]  [facility]    [phone]  [facility] Kayley Marie      [phone]  [facility]Kayley Hampton      [phone]  [facility]      [phone]    [facility]       [phone]  [facility]       [phone]      Other Lactation Help:  Support in other " languages:  British:  Guillermina (IBCLC/ British) [phone]  [email or web address].  La Leche League: Si qustanton mendezuda en espanol con tracy pecho por favor llama Deisi al [phone].  [facility] [place]  For more information call [facility] [phone] or [place] at [phone] ([place]) or Corrina Mascorro at [phone] ([place]).  [place]: Fridays, 10:30 am to noon. [facility] [place] [address]: Wednesdays, 1:00-2:00 PM. [facility], [address] [place] [address]      Telephone and Online Support    [facility] ++HAS VIRTUAL SUPPORT++ (call for eligibility information)   [phone]    Baby[employer]af[employer] (www.babycafeusa.org) (now in person)    La Leche League International   ++HAS VIRTUAL SUPPORT++  www.llli.org  0-572-1-LA-LECHE ([phone])  Local referral line [phone]  Si krystle robles en espanol con tracy pecho por favor llama Deisi al [phone].    [employer]-- up to date lactation information  Www.Dedicated Devices.Clixtr    International Breastfeeding [place] (Joe Nichole)  Http://ibconline.ca/    The InfantRisk Call Center is available to answer questions about the use of medications during pregnancy and while breastfeeding  [phone]  www.infantCafÃ© [place].com     Office on Women's Health National Breastfeeding Help Line  8am to 5pm, English and British 1-525.788.8834 option 1    https://www.womenshealth.gov/breastfeeding/ Tclt5Gmgv Prosper (free on [employer] prosper store or Google Play)    LactMed Prosper (free on [employer] prosper store or Google Play) LactMed is available online at https://toxnet.nlm.nih.gov/newtoxnet/lactmed.htm and is now available on your mobile device. The free LactMed Prosper for iPhone/iPod Touch and Android can be downloaded at http://toxnet.nlm.nih.gov/help/lactmedapp.htm.

## 2023-08-02 ENCOUNTER — TRANSCRIBE ORDERS (OUTPATIENT)
Dept: OTHER | Age: 28
End: 2023-08-02

## 2023-08-16 ENCOUNTER — OFFICE VISIT (OUTPATIENT)
Dept: OBGYN | Facility: CLINIC | Age: 28
End: 2023-08-16
Attending: OBSTETRICS & GYNECOLOGY
Payer: COMMERCIAL

## 2023-08-16 VITALS
BODY MASS INDEX: 27.87 KG/M2 | DIASTOLIC BLOOD PRESSURE: 74 MMHG | HEART RATE: 72 BPM | WEIGHT: 138 LBS | SYSTOLIC BLOOD PRESSURE: 112 MMHG

## 2023-08-16 DIAGNOSIS — Z30.012: ICD-10-CM

## 2023-08-16 DIAGNOSIS — Z30.011 ORAL CONTRACEPTION INITIAL PRESCRIPTION: Primary | ICD-10-CM

## 2023-08-16 PROBLEM — E11.9 DIABETES (H): Status: RESOLVED | Noted: 2023-07-14 | Resolved: 2023-08-16

## 2023-08-16 PROCEDURE — G0463 HOSPITAL OUTPT CLINIC VISIT: HCPCS | Performed by: OBSTETRICS & GYNECOLOGY

## 2023-08-16 PROCEDURE — 99204 OFFICE O/P NEW MOD 45 MIN: CPT | Performed by: OBSTETRICS & GYNECOLOGY

## 2023-08-16 RX ORDER — ACETAMINOPHEN AND CODEINE PHOSPHATE 120; 12 MG/5ML; MG/5ML
0.35 SOLUTION ORAL DAILY
Qty: 84 TABLET | Refills: 4 | Status: SHIPPED | OUTPATIENT
Start: 2023-08-16

## 2023-08-16 NOTE — PATIENT INSTRUCTIONS
Thank you for trusting us with your care!     If you need to contact us for questions about:  Symptoms, Scheduling & Medical Questions; Non-urgent (2-3 day response) Valerie message, Urgent (needing response today) [phone] (if after 3:30pm next day response)   Prescriptions: Please call your Pharmacy   Billing: Leslie [phone] or YESSICA Physicians:[phone]

## 2023-08-16 NOTE — PROGRESS NOTES
PROCEDURE NOTE: INTRAUTERINE CONTRACEPTION INSERTION     2023    PATIENT INFORMATION:   Subjective: ***  Patient's last menstrual period was 10/15/2022.  Current contraception: ***  No unprotected intercourse in the last week.  Urine pregnancy test was ***.    OB History    Para Term  AB Living   1 1 1 0 0 1   SAB IAB Ectopic Multiple Live Births   0 0 0 0 1      # Outcome Date GA Lbr Jf/2nd Weight Sex Delivery Anes PTL Lv   1 Term 07/15/23 39w0d 06:45 / 02:12 2.71 kg (5 lb 15.6 oz) M Vag-Spont EPI, IV  LENIN      Name: ZAINA MADDOX,PARAS-CAS      Apgar1: 8  Apgar5: 9       LMP 10/15/2022     PREOPERATIVE DIAGNOSIS: ***    POSTOPERATIVE DIAGNOSIS: Same    PROCEDURE PERFORMED: *** IUC insertion    ANESTHESIA: none     CONSENT: Her questions were answered.  She was informed about the risks, benefits and alternatives to *** insertion.  She understands potential changes in bleeding pattern and the following risks: expulsion (complete or partial), infection, perforation, pregnancy, ectopic pregnancy and the possibility for the IUD to become embedded in the uterus.    Written informed consent was obtained and scanned into the medical record.  Patient received and verbalized understanding of discharge instrcutions.    PROCEDURE DETAILS:   The patient was placed in the dorsal lithotomy position. Bimanual exam showed the uterus to be in the *** position. A speculum was inserted in the vagina, and the cervix visualized.  Testing for gc/ct obtained and then the cervix was swabbed with betadine.    A single-toothed tenaculum was applied to the anterior lip of the cervix for stabilization. The uterus sounded to a depth of *** cm.     *** IUD placed without difficulty, strings trimmed to 2.5 cm.   LOT # ***   EXP date: ***   Expected removal date: ***     EBL: ***  Complications: none noted  Patient tolerated the procedure well.    PLAN:   -- Post-operative instructions reviewed including symptoms of  complications  -- IUD identification card given to patient  -- OTC ibuprofen as needed for mild to moderate pain (ibuprofen 400-800 mg PO TID PRN for cramping)   --Follow up prn      Chela Jones MD, MPH

## 2023-08-16 NOTE — PROGRESS NOTES
"Encounter facilitated by Syriac interpreted on an iPad    SUBJECTIVE   Isela is a 27 year old , 4 weeks postpartum  at [facility] who is here today for postpartum contraception counseling. She is seen at [facility] where she was given a handout outlining contraception options and she is interested in discussing them further today. She at first expressed interest in the Nexplanon implant but upon inquiry notes she chose it because she \"understands that the majority of women use the implant.\" She is curious to learn more about all of her contraceptive options and their side effects, and expresses specific concern over the effect of contraception on lactation as the baby prefers bottle feeding over breast, and she wishes to produce more milk overall. She reports difficulty establishing latch early on as baby had to be readmitted for jaundice and was under lights. She has used external condoms previously, no other methods.    Patient's last menstrual period was 10/15/2022. Postpartum bleeding stopped 5 days ago.   No current contraception. Had unprotected intercourse 2-3 days ago.   Urine pregnancy test today was negative.     Menstruation history prior to pregnancy:   3-4 days of bleeding, every 30 days (sometimes early). 'Normal' flow, no heavy bleeding any of the days.   Symptoms before/during menses: bloating, no cramps      Gynecologic History  Patient's last menstrual period was 10/15/2022.   Menstrual History:      2023    10:28 AM   Menstrual History   LAST MENSTRUAL PERIOD 10/15/2022     Current contraception: none  Number of partners in last year: 1    No results found for: PAP     Obstetric History  OB History    Para Term  AB Living   1 1 1 0 0 1   SAB IAB Ectopic Multiple Live Births   0 0 0 0 1      # Outcome Date GA Lbr Jf/2nd Weight Sex Delivery Anes PTL Lv   1 Term 07/15/23 39w0d 06:45 / 02:12 2.71 kg (5 lb 15.6 oz) M Vag-Spont EPI, IV  LENIN      Name: ZAINA MDADOX,MALE-ISELA "      Apgar1: 8  Apgar5: 9      Past Medical History  Past Medical History:   Diagnosis Date    Gestational diabetes mellitus 2023     Past Surgical History  No past surgical history on file.    Medications  Current Outpatient Medications   Medication    norethindrone (MICRONOR) 0.35 MG tablet    Prenatal Vit-Fe Fumarate-FA (PNV PRENATAL PLUS MULTIVITAMIN) 27-1 MG TABS per tablet    Ulipristal Acetate (JERARDO) 30 MG tablet    VITAMIN D PO     No current facility-administered medications for this visit.     Allergies   No Known Allergies    Social History  Social History     Tobacco Use    Smoking status: Never    Smokeless tobacco: Never   Substance Use Topics    Alcohol use: Not Currently    Drug use: Never       Family History  No family history of breast, uterine, ovarian or colon cancer.    Review of Systems  CONSTITUTIONAL: NEGATIVE for fever, chills  EYES: NEGATIVE for vision changes   RESP: NEGATIVE for significant cough or SOB  CV: NEGATIVE for chest pain, palpitations   GI: NEGATIVE for nausea, abdominal pain, heartburn, or change in bowel habits  : NEGATIVE for frequency, dysuria, or hematuria  MUSCULOSKELETAL: NEGATIVE for significant arthralgias or myalgia  NEURO: NEGATIVE for weakness, dizziness or paresthesias or headache    OBJECTIVE   /74   Pulse 72   Wt 62.6 kg (138 lb)   LMP 10/15/2022   Breastfeeding Yes   BMI 27.87 kg/m    BMI: Body mass index is 27.87 kg/m .    General:  Alert, no distress   Head:  Normocephalic, without obvious abnormality     ASSESSMENT   Isela Mcmahon is a 27 year old , contraception counseling visit.    PLAN     Contraception   -- Options discussed with patient, including pills, ring, patch, DMPA, IUD (LNG, copper), Implant (Nexplanon).   -- Emergency contraception (Plan B/Jerardo/copper IUD) discussed.   -- Isela most interested in jerardo today and then POP.  Rx sent today. Reviewed need for precise timing window daily and what to do if patient misses  one pill, two consecutive pills, etc.  -- Did not discuss permanent contraception options today as patient expressed likely desire to have a pregnancy into the future.    RTC prn, reviewed that if the method was not working well enough for her, we would be happy to see her to review other options.    Vlad Su, MS-3    I appreciate the note above by medical student, Vlad Su.  I was present with the medical student who participated in the service and in the documentation of the note. I have verified the history and personally performed the physical exam and medical decision making. I agree with the assessment and plan of care as documented in the note.  50 minutes spent on the date of the encounter doing chart review (previous clinic visits, hospital records, lab results, imaging studies, and procedural documentation) and the patient's history and exam, documentation and further activities as noted above.    Chela Jones MD MPH

## 2023-08-16 NOTE — PROGRESS NOTES
Chief Complaint   Patient presents with    Minor Procedure     Nexplanon implant    Susannah Gordon LPN

## 2023-08-16 NOTE — LETTER
"2023       RE: Isela Mcmahon  [address]   [address]  [facility] [address]     Dear Colleague,    Thank you for referring your patient, Isela Mcmahon, to the [facility] at [facility]. Please see a copy of my visit note below.    Encounter facilitated by Sri Lankan interpreted on an iPad    SUBJECTIVE   Isela is a 27 year old , 4 weeks postpartum  at [facility] who is here today for postpartum contraception counseling. She is seen at [facility] where she was given a handout outlining contraception options and she is interested in discussing them further today. She at first expressed interest in the Nexplanon implant but upon inquiry notes she chose it because she \"understands that the majority of women use the implant.\" She is curious to learn more about all of her contraceptive options and their side effects, and expresses specific concern over the effect of contraception on lactation as the baby prefers bottle feeding over breast, and she wishes to produce more milk overall. She reports difficulty establishing latch early on as baby had to be readmitted for jaundice and was under lights. She has used external condoms previously, no other methods.    Patient's last menstrual period was 10/15/2022. Postpartum bleeding stopped 5 days ago.   No current contraception. Had unprotected intercourse 2-3 days ago.   Urine pregnancy test today was negative.     Menstruation history prior to pregnancy:   3-4 days of bleeding, every 30 days (sometimes early). 'Normal' flow, no heavy bleeding any of the days.   Symptoms before/during menses: bloating, no cramps      Gynecologic History  Patient's last menstrual period was 10/15/2022.   Menstrual History:      2023    10:28 AM   Menstrual History   LAST MENSTRUAL PERIOD 10/15/2022     Current contraception: none  Number of partners in last year: 1    No results found for: " PAP     Obstetric History  OB History    Para Term  AB Living   1 1 1 0 0 1   SAB IAB Ectopic Multiple Live Births   0 0 0 0 1      # Outcome Date GA Lbr Jf/2nd Weight Sex Delivery Anes PTL Lv   1 Term 07/15/23 39w0d 06:45 / 02:12 2.71 kg (5 lb 15.6 oz) M Vag-Spont EPI, IV  LENIN      Name: ZAINA MCMAHON,PARAS-ISELA      Apgar1: 8  Apgar5: 9      Past Medical History  Past Medical History:   Diagnosis Date    Gestational diabetes mellitus 2023     Past Surgical History  No past surgical history on file.    Medications  Current Outpatient Medications   Medication    norethindrone (MICRONOR) 0.35 MG tablet    Prenatal Vit-Fe Fumarate-FA (PNV PRENATAL PLUS MULTIVITAMIN) 27-1 MG TABS per tablet    Ulipristal Acetate (JERARDO) 30 MG tablet    VITAMIN D PO     No current facility-administered medications for this visit.     Allergies   No Known Allergies    Social History  Social History     Tobacco Use    Smoking status: Never    Smokeless tobacco: Never   Substance Use Topics    Alcohol use: Not Currently    Drug use: Never       Family History  No family history of breast, uterine, ovarian or colon cancer.    Review of Systems  CONSTITUTIONAL: NEGATIVE for fever, chills  EYES: NEGATIVE for vision changes   RESP: NEGATIVE for significant cough or SOB  CV: NEGATIVE for chest pain, palpitations   GI: NEGATIVE for nausea, abdominal pain, heartburn, or change in bowel habits  : NEGATIVE for frequency, dysuria, or hematuria  MUSCULOSKELETAL: NEGATIVE for significant arthralgias or myalgia  NEURO: NEGATIVE for weakness, dizziness or paresthesias or headache    OBJECTIVE   /74   Pulse 72   Wt 62.6 kg (138 lb)   LMP 10/15/2022   Breastfeeding Yes   BMI 27.87 kg/m    BMI: Body mass index is 27.87 kg/m .    General:  Alert, no distress   Head:  Normocephalic, without obvious abnormality     ASSESSMENT   Isela Mcmahon is a 27 year old , contraception counseling visit.    PLAN     Contraception    -- Options discussed with patient, including pills, ring, patch, DMPA, IUD (LNG, copper), Implant (Nexplanon).   -- Emergency contraception (Plan B/Francisca/copper IUD) discussed.   -- Isela most interested in francisca today and then POP.  Rx sent today. Reviewed need for precise timing window daily and what to do if patient misses one pill, two consecutive pills, etc.  -- Did not discuss permanent contraception options today as patient expressed likely desire to have a pregnancy into the future.    RTC prn, reviewed that if the method was not working well enough for her, we would be happy to see her to review other options.    Vlad Su, MS-3    I appreciate the note above by medical student, Vlad Su.  I was present with the medical student who participated in the service and in the documentation of the note. I have verified the history and personally performed the physical exam and medical decision making. I agree with the assessment and plan of care as documented in the note.  50 minutes spent on the date of the encounter doing chart review (previous clinic visits, hospital records, lab results, imaging studies, and procedural documentation) and the patient's history and exam, documentation and further activities as noted above.    Chela Jones MD MPH              Chief Complaint   Patient presents with    Minor Procedure     Nexplanon implant    Susannah Gordon LPN